# Patient Record
Sex: FEMALE | Race: WHITE | NOT HISPANIC OR LATINO | URBAN - METROPOLITAN AREA
[De-identification: names, ages, dates, MRNs, and addresses within clinical notes are randomized per-mention and may not be internally consistent; named-entity substitution may affect disease eponyms.]

---

## 2017-09-28 ENCOUNTER — INPATIENT (INPATIENT)
Facility: HOSPITAL | Age: 67
LOS: 0 days | Discharge: ANOTHER HOSPITAL | End: 2017-09-29
Attending: INTERNAL MEDICINE | Admitting: INTERNAL MEDICINE
Payer: MEDICARE

## 2017-09-28 VITALS
DIASTOLIC BLOOD PRESSURE: 78 MMHG | TEMPERATURE: 99 F | WEIGHT: 154.98 LBS | HEIGHT: 64 IN | RESPIRATION RATE: 18 BRPM | HEART RATE: 54 BPM | OXYGEN SATURATION: 99 % | SYSTOLIC BLOOD PRESSURE: 177 MMHG

## 2017-09-28 LAB
HCT VFR BLD CALC: 40.1 % — SIGNIFICANT CHANGE UP (ref 34.5–45)
HGB BLD-MCNC: 13.2 G/DL — SIGNIFICANT CHANGE UP (ref 11.5–15.5)
MCHC RBC-ENTMCNC: 28.3 PG — SIGNIFICANT CHANGE UP (ref 27–34)
MCHC RBC-ENTMCNC: 32.9 GM/DL — SIGNIFICANT CHANGE UP (ref 32–36)
MCV RBC AUTO: 86.2 FL — SIGNIFICANT CHANGE UP (ref 80–100)
PLATELET # BLD AUTO: 192 K/UL — SIGNIFICANT CHANGE UP (ref 150–400)
RBC # BLD: 4.65 M/UL — SIGNIFICANT CHANGE UP (ref 3.8–5.2)
RBC # FLD: 14.3 % — SIGNIFICANT CHANGE UP (ref 10.3–14.5)
WBC # BLD: 6.3 K/UL — SIGNIFICANT CHANGE UP (ref 3.8–10.5)
WBC # FLD AUTO: 6.3 K/UL — SIGNIFICANT CHANGE UP (ref 3.8–10.5)

## 2017-09-28 PROCEDURE — 93010 ELECTROCARDIOGRAM REPORT: CPT

## 2017-09-28 PROCEDURE — 71010: CPT | Mod: 26

## 2017-09-28 RX ORDER — NITROGLYCERIN 6.5 MG
0.5 CAPSULE, EXTENDED RELEASE ORAL ONCE
Qty: 0 | Refills: 0 | Status: COMPLETED | OUTPATIENT
Start: 2017-09-28 | End: 2017-09-28

## 2017-09-28 RX ORDER — SODIUM CHLORIDE 9 MG/ML
3 INJECTION INTRAMUSCULAR; INTRAVENOUS; SUBCUTANEOUS ONCE
Qty: 0 | Refills: 0 | Status: COMPLETED | OUTPATIENT
Start: 2017-09-28 | End: 2017-09-28

## 2017-09-28 RX ADMIN — SODIUM CHLORIDE 3 MILLILITER(S): 9 INJECTION INTRAMUSCULAR; INTRAVENOUS; SUBCUTANEOUS at 23:36

## 2017-09-28 RX ADMIN — Medication 0.5 INCH(S): at 23:36

## 2017-09-28 NOTE — ED ADULT NURSE NOTE - NS ED NOTE ABUSE SUSPICION NEGLECT YN
----- Message from Iraj Strickland PA-C sent at 1/4/2017  4:24 PM CST -----  Please call the patient regarding his abnormal result. Still mild anemia, should take OTC Fe for 3 months   No

## 2017-09-28 NOTE — ED PROVIDER NOTE - OBJECTIVE STATEMENT
66 yo female PMH CAD, MI, s/p 10 stents, from [place] and visiting son since yesterday after 6 week trip in [place], presents c/o chest heaviness starting on right and radiating to left chest and right jaw for the past 2-3 hours, relieved after 2 sublingual NTG and 4 baby aspirin. Pt states her anginal equivalent for CP is right sided jaw pain but has never felt pain radiating to left chest. +Dry cough for weeks that she was told is allergic. Denies pleuritic component to CP. +SOB before but not at this time. Had pain to achilles area of left ankle during trip in [place] which she thought was musculoskeletal. Denies dizziness, fainting. Had heavy Italian meal before bed but denies indigestion or GERD symptoms. Last hospitalization, catheterization, stent placement was in 2014. 68 yo female PMH CAD, MI, s/p 10 stents, from [place] and visiting son since yesterday after 6 week trip in [place], presents c/o chest heaviness starting on right and radiating to left chest and right jaw for the past 2-3 hours.  Sig relief with 2 sublingual NTG and 4 baby aspirin.  Pt states her anginal equivalent for CP is right sided jaw pain but has never felt pain radiating to left chest.  +Dry cough for weeks that she was told is allergic.  Denies pleuritic component to CP.  +SOB before but not at this time.  Had pain to achilles area of left ankle during trip in [place] which she thought was musculoskeletal.  Denies dizziness, fainting.  Had heavy Italian meal before bed but denies indigestion or GERD symptoms.  Last hospitalization, catheterization, stent placement was in 2014.

## 2017-09-28 NOTE — ED ADULT NURSE NOTE - OBJECTIVE STATEMENT
Pt states she was reading in a chair when she developed sharp chest pain located medial and radiating to should blades.  Pt states she has hx of 2 MI last one in 2005 and 10 stents.  Pt took 4 baby asa and total 2 nitro tabs and now states the chest pain has obsolved.  BP as noted.  Pt denies SOB at this time, but did have SOB earlier with the chest pain.  Pt denies new onset edema but states she recently traveled out of states and states she had a "bug bite on her right leg and noticed a red bump with slight swelling".  Safety maintained, will continue to [facility].

## 2017-09-28 NOTE — ED ADULT TRIAGE NOTE - CHIEF COMPLAINT QUOTE
chest pain. Hx of MI with 10 stents. Reports bilateral chest pain. took 4 baby aspirin and 2 SL nitro PTA with significant symptom improvement. No acute distress noted.

## 2017-09-29 ENCOUNTER — INPATIENT (INPATIENT)
Facility: HOSPITAL | Age: 67
LOS: 7 days | Discharge: HOME | DRG: 270 | End: 2017-10-07
Attending: STUDENT IN AN ORGANIZED HEALTH CARE EDUCATION/TRAINING PROGRAM | Admitting: THORACIC SURGERY (CARDIOTHORACIC VASCULAR SURGERY)
Payer: MEDICARE

## 2017-09-29 ENCOUNTER — TRANSCRIPTION ENCOUNTER (OUTPATIENT)
Age: 67
End: 2017-09-29

## 2017-09-29 VITALS
DIASTOLIC BLOOD PRESSURE: 66 MMHG | HEART RATE: 44 BPM | OXYGEN SATURATION: 98 % | TEMPERATURE: 99 F | SYSTOLIC BLOOD PRESSURE: 177 MMHG | RESPIRATION RATE: 16 BRPM

## 2017-09-29 VITALS
TEMPERATURE: 98 F | DIASTOLIC BLOOD PRESSURE: 73 MMHG | SYSTOLIC BLOOD PRESSURE: 160 MMHG | WEIGHT: 158.07 LBS | HEART RATE: 49 BPM | OXYGEN SATURATION: 95 % | RESPIRATION RATE: 18 BRPM

## 2017-09-29 DIAGNOSIS — C50.912 MALIGNANT NEOPLASM OF UNSPECIFIED SITE OF LEFT FEMALE BREAST: ICD-10-CM

## 2017-09-29 DIAGNOSIS — I25.10 ATHEROSCLEROTIC HEART DISEASE OF NATIVE CORONARY ARTERY WITHOUT ANGINA PECTORIS: ICD-10-CM

## 2017-09-29 DIAGNOSIS — Z90.12 ACQUIRED ABSENCE OF LEFT BREAST AND NIPPLE: Chronic | ICD-10-CM

## 2017-09-29 DIAGNOSIS — R35.0 FREQUENCY OF MICTURITION: ICD-10-CM

## 2017-09-29 DIAGNOSIS — Z95.5 PRESENCE OF CORONARY ANGIOPLASTY IMPLANT AND GRAFT: Chronic | ICD-10-CM

## 2017-09-29 DIAGNOSIS — I20.0 UNSTABLE ANGINA: ICD-10-CM

## 2017-09-29 DIAGNOSIS — I25.110 ATHEROSCLEROTIC HEART DISEASE OF NATIVE CORONARY ARTERY WITH UNSTABLE ANGINA PECTORIS: ICD-10-CM

## 2017-09-29 DIAGNOSIS — Z29.9 ENCOUNTER FOR PROPHYLACTIC MEASURES, UNSPECIFIED: ICD-10-CM

## 2017-09-29 LAB
ALBUMIN SERPL ELPH-MCNC: 3.7 G/DL — SIGNIFICANT CHANGE UP (ref 3.3–5)
ALP SERPL-CCNC: 61 U/L — SIGNIFICANT CHANGE UP (ref 40–120)
ALT FLD-CCNC: 37 U/L — SIGNIFICANT CHANGE UP (ref 12–78)
ANION GAP SERPL CALC-SCNC: 9 MMOL/L — SIGNIFICANT CHANGE UP (ref 5–17)
APPEARANCE UR: CLEAR — SIGNIFICANT CHANGE UP
APTT BLD: 32.5 SEC — SIGNIFICANT CHANGE UP (ref 27.5–37.4)
APTT BLD: 33.1 SEC — SIGNIFICANT CHANGE UP (ref 27.5–37.4)
AST SERPL-CCNC: 21 U/L — SIGNIFICANT CHANGE UP (ref 15–37)
BASOPHILS # BLD AUTO: 0.1 K/UL — SIGNIFICANT CHANGE UP (ref 0–0.2)
BASOPHILS # BLD AUTO: 0.1 K/UL — SIGNIFICANT CHANGE UP (ref 0–0.2)
BASOPHILS NFR BLD AUTO: 1.1 % — SIGNIFICANT CHANGE UP (ref 0–2)
BASOPHILS NFR BLD AUTO: 2 % — SIGNIFICANT CHANGE UP (ref 0–2)
BILIRUB SERPL-MCNC: 0.3 MG/DL — SIGNIFICANT CHANGE UP (ref 0.2–1.2)
BILIRUB UR-MCNC: NEGATIVE — SIGNIFICANT CHANGE UP
BUN SERPL-MCNC: 17 MG/DL — SIGNIFICANT CHANGE UP (ref 7–23)
CALCIUM SERPL-MCNC: 9.1 MG/DL — SIGNIFICANT CHANGE UP (ref 8.5–10.1)
CHLORIDE SERPL-SCNC: 105 MMOL/L — SIGNIFICANT CHANGE UP (ref 96–108)
CK SERPL-CCNC: 36 U/L — SIGNIFICANT CHANGE UP (ref 26–192)
CK SERPL-CCNC: 41 U/L — SIGNIFICANT CHANGE UP (ref 26–192)
CO2 SERPL-SCNC: 27 MMOL/L — SIGNIFICANT CHANGE UP (ref 22–31)
COLOR SPEC: SIGNIFICANT CHANGE UP
COMMENT - URINE: SIGNIFICANT CHANGE UP
CREAT SERPL-MCNC: 0.76 MG/DL — SIGNIFICANT CHANGE UP (ref 0.5–1.3)
D DIMER BLD IA.RAPID-MCNC: 174 NG/ML DDU — SIGNIFICANT CHANGE UP
DIFF PNL FLD: NEGATIVE — SIGNIFICANT CHANGE UP
EOSINOPHIL # BLD AUTO: 0.1 K/UL — SIGNIFICANT CHANGE UP (ref 0–0.5)
EOSINOPHIL # BLD AUTO: 0.1 K/UL — SIGNIFICANT CHANGE UP (ref 0–0.5)
EOSINOPHIL NFR BLD AUTO: 2.2 % — SIGNIFICANT CHANGE UP (ref 0–6)
GLUCOSE SERPL-MCNC: 110 MG/DL — HIGH (ref 70–99)
GLUCOSE UR QL: NEGATIVE — SIGNIFICANT CHANGE UP
HCT VFR BLD CALC: 43.7 % — SIGNIFICANT CHANGE UP (ref 34.5–45)
HGB BLD-MCNC: 14.6 G/DL — SIGNIFICANT CHANGE UP (ref 11.5–15.5)
INR BLD: 1.06 RATIO — SIGNIFICANT CHANGE UP (ref 0.88–1.16)
INR BLD: 1.1 RATIO — SIGNIFICANT CHANGE UP (ref 0.88–1.16)
KETONES UR-MCNC: NEGATIVE — SIGNIFICANT CHANGE UP
LEUKOCYTE ESTERASE UR-ACNC: ABNORMAL
LIDOCAIN IGE QN: 95 U/L — SIGNIFICANT CHANGE UP (ref 73–393)
LYMPHOCYTES # BLD AUTO: 2.2 K/UL — SIGNIFICANT CHANGE UP (ref 1–3.3)
LYMPHOCYTES # BLD AUTO: 2.2 K/UL — SIGNIFICANT CHANGE UP (ref 1–3.3)
LYMPHOCYTES # BLD AUTO: 29 % — SIGNIFICANT CHANGE UP (ref 13–44)
LYMPHOCYTES # BLD AUTO: 34.8 % — SIGNIFICANT CHANGE UP (ref 13–44)
MANUAL DIF COMMENT BLD-IMP: MANUAL DIFF — SIGNIFICANT CHANGE UP
MANUAL SMEAR VERIFICATION: SIGNIFICANT CHANGE UP
MCHC RBC-ENTMCNC: 29.6 PG — SIGNIFICANT CHANGE UP (ref 27–34)
MCHC RBC-ENTMCNC: 33.5 GM/DL — SIGNIFICANT CHANGE UP (ref 32–36)
MCV RBC AUTO: 88.5 FL — SIGNIFICANT CHANGE UP (ref 80–100)
MONOCYTES # BLD AUTO: 0.8 K/UL — SIGNIFICANT CHANGE UP (ref 0–0.9)
MONOCYTES # BLD AUTO: 0.9 K/UL — SIGNIFICANT CHANGE UP (ref 0–0.9)
MONOCYTES NFR BLD AUTO: 12.1 % — SIGNIFICANT CHANGE UP (ref 2–14)
MONOCYTES NFR BLD AUTO: 13 % — SIGNIFICANT CHANGE UP (ref 2–14)
MYELOCYTES NFR BLD: 1 % — HIGH (ref 0–0)
NEUTROPHILS # BLD AUTO: 3.1 K/UL — SIGNIFICANT CHANGE UP (ref 1.8–7.4)
NEUTROPHILS # BLD AUTO: 3.2 K/UL — SIGNIFICANT CHANGE UP (ref 1.8–7.4)
NEUTROPHILS NFR BLD AUTO: 49.8 % — SIGNIFICANT CHANGE UP (ref 43–77)
NEUTROPHILS NFR BLD AUTO: 54 % — SIGNIFICANT CHANGE UP (ref 43–77)
NEUTS BAND # BLD: 1 % — SIGNIFICANT CHANGE UP (ref 0–8)
NITRITE UR-MCNC: NEGATIVE — SIGNIFICANT CHANGE UP
NT-PROBNP SERPL-SCNC: 190 PG/ML — HIGH (ref 0–125)
PA ADP PRP-ACNC: 160 PRU — LOW (ref 194–417)
PH UR: 6.5 — SIGNIFICANT CHANGE UP (ref 5–8)
PLAT MORPH BLD: NORMAL — SIGNIFICANT CHANGE UP
PLATELET # BLD AUTO: 247 K/UL — SIGNIFICANT CHANGE UP (ref 150–400)
PLATELET COUNT - ESTIMATE: ADEQUATE — SIGNIFICANT CHANGE UP
POTASSIUM SERPL-MCNC: 3.5 MMOL/L — SIGNIFICANT CHANGE UP (ref 3.5–5.3)
POTASSIUM SERPL-SCNC: 3.5 MMOL/L — SIGNIFICANT CHANGE UP (ref 3.5–5.3)
PROT SERPL-MCNC: 6.8 GM/DL — SIGNIFICANT CHANGE UP (ref 6–8.3)
PROT UR-MCNC: NEGATIVE — SIGNIFICANT CHANGE UP
PROTHROM AB SERPL-ACNC: 11.5 SEC — SIGNIFICANT CHANGE UP (ref 9.8–12.7)
PROTHROM AB SERPL-ACNC: 11.9 SEC — SIGNIFICANT CHANGE UP (ref 9.8–12.7)
RBC # BLD: 4.94 M/UL — SIGNIFICANT CHANGE UP (ref 3.8–5.2)
RBC # FLD: 13.9 % — SIGNIFICANT CHANGE UP (ref 10.3–14.5)
RBC BLD AUTO: NORMAL — SIGNIFICANT CHANGE UP
RBC CASTS # UR COMP ASSIST: SIGNIFICANT CHANGE UP /HPF (ref 0–2)
SODIUM SERPL-SCNC: 141 MMOL/L — SIGNIFICANT CHANGE UP (ref 135–145)
SP GR SPEC: >1.03 — HIGH (ref 1.01–1.02)
TROPONIN I SERPL-MCNC: 0.06 NG/ML — HIGH (ref 0.01–0.04)
TROPONIN I SERPL-MCNC: 0.08 NG/ML — HIGH (ref 0.01–0.04)
TROPONIN I SERPL-MCNC: <0.015 NG/ML — SIGNIFICANT CHANGE UP (ref 0.01–0.04)
UROBILINOGEN FLD QL: NEGATIVE — SIGNIFICANT CHANGE UP
WBC # BLD: 6.4 K/UL — SIGNIFICANT CHANGE UP (ref 3.8–10.5)
WBC # FLD AUTO: 6.4 K/UL — SIGNIFICANT CHANGE UP (ref 3.8–10.5)
WBC UR QL: ABNORMAL /HPF (ref 0–5)

## 2017-09-29 PROCEDURE — 93010 ELECTROCARDIOGRAM REPORT: CPT

## 2017-09-29 PROCEDURE — 99223 1ST HOSP IP/OBS HIGH 75: CPT

## 2017-09-29 PROCEDURE — 93458 L HRT ARTERY/VENTRICLE ANGIO: CPT | Mod: 26

## 2017-09-29 PROCEDURE — 99285 EMERGENCY DEPT VISIT HI MDM: CPT

## 2017-09-29 PROCEDURE — 99222 1ST HOSP IP/OBS MODERATE 55: CPT

## 2017-09-29 RX ORDER — METOPROLOL TARTRATE 50 MG
100 TABLET ORAL AT BEDTIME
Qty: 0 | Refills: 0 | Status: DISCONTINUED | OUTPATIENT
Start: 2017-09-29 | End: 2017-09-29

## 2017-09-29 RX ORDER — OXYBUTYNIN CHLORIDE 5 MG
15 TABLET ORAL DAILY
Qty: 0 | Refills: 0 | Status: DISCONTINUED | OUTPATIENT
Start: 2017-09-29 | End: 2017-09-29

## 2017-09-29 RX ORDER — SODIUM CHLORIDE 9 MG/ML
1000 INJECTION INTRAMUSCULAR; INTRAVENOUS; SUBCUTANEOUS
Qty: 0 | Refills: 0 | Status: DISCONTINUED | OUTPATIENT
Start: 2017-09-29 | End: 2017-09-29

## 2017-09-29 RX ORDER — INFLUENZA VIRUS VACCINE 15; 15; 15; 15 UG/.5ML; UG/.5ML; UG/.5ML; UG/.5ML
0.5 SUSPENSION INTRAMUSCULAR ONCE
Qty: 0 | Refills: 0 | Status: DISCONTINUED | OUTPATIENT
Start: 2017-09-29 | End: 2017-09-29

## 2017-09-29 RX ORDER — OXYBUTYNIN CHLORIDE 5 MG
15 TABLET ORAL AT BEDTIME
Qty: 0 | Refills: 0 | Status: DISCONTINUED | OUTPATIENT
Start: 2017-09-29 | End: 2017-09-30

## 2017-09-29 RX ORDER — MULTIVIT-MIN/FERROUS GLUCONATE 9 MG/15 ML
1 LIQUID (ML) ORAL
Qty: 0 | Refills: 0 | COMMUNITY

## 2017-09-29 RX ORDER — METOPROLOL TARTRATE 50 MG
100 TABLET ORAL DAILY
Qty: 0 | Refills: 0 | Status: DISCONTINUED | OUTPATIENT
Start: 2017-09-29 | End: 2017-09-30

## 2017-09-29 RX ORDER — PANTOPRAZOLE SODIUM 20 MG/1
40 TABLET, DELAYED RELEASE ORAL
Qty: 0 | Refills: 0 | Status: DISCONTINUED | OUTPATIENT
Start: 2017-09-29 | End: 2017-10-07

## 2017-09-29 RX ORDER — OXYBUTYNIN CHLORIDE 5 MG
1 TABLET ORAL
Qty: 0 | Refills: 0 | COMMUNITY

## 2017-09-29 RX ORDER — ASPIRIN/CALCIUM CARB/MAGNESIUM 324 MG
81 TABLET ORAL DAILY
Qty: 0 | Refills: 0 | Status: DISCONTINUED | OUTPATIENT
Start: 2017-09-29 | End: 2017-10-07

## 2017-09-29 RX ORDER — METOPROLOL TARTRATE 50 MG
25 TABLET ORAL EVERY 12 HOURS
Qty: 0 | Refills: 0 | Status: DISCONTINUED | OUTPATIENT
Start: 2017-09-29 | End: 2017-09-29

## 2017-09-29 RX ORDER — METOPROLOL TARTRATE 50 MG
1 TABLET ORAL
Qty: 0 | Refills: 0 | COMMUNITY

## 2017-09-29 RX ORDER — CLOPIDOGREL BISULFATE 75 MG/1
1 TABLET, FILM COATED ORAL
Qty: 0 | Refills: 0 | COMMUNITY

## 2017-09-29 RX ORDER — DILTIAZEM HCL 120 MG
1 CAPSULE, EXT RELEASE 24 HR ORAL
Qty: 0 | Refills: 0 | COMMUNITY

## 2017-09-29 RX ORDER — ACETAMINOPHEN 500 MG
650 TABLET ORAL EVERY 6 HOURS
Qty: 0 | Refills: 0 | Status: DISCONTINUED | OUTPATIENT
Start: 2017-09-29 | End: 2017-09-29

## 2017-09-29 RX ORDER — ASPIRIN/CALCIUM CARB/MAGNESIUM 324 MG
325 TABLET ORAL DAILY
Qty: 0 | Refills: 0 | Status: DISCONTINUED | OUTPATIENT
Start: 2017-09-29 | End: 2017-09-29

## 2017-09-29 RX ORDER — ATORVASTATIN CALCIUM 80 MG/1
40 TABLET, FILM COATED ORAL AT BEDTIME
Qty: 0 | Refills: 0 | Status: DISCONTINUED | OUTPATIENT
Start: 2017-09-29 | End: 2017-09-29

## 2017-09-29 RX ORDER — SODIUM CHLORIDE 9 MG/ML
3 INJECTION INTRAMUSCULAR; INTRAVENOUS; SUBCUTANEOUS EVERY 8 HOURS
Qty: 0 | Refills: 0 | Status: DISCONTINUED | OUTPATIENT
Start: 2017-09-29 | End: 2017-10-07

## 2017-09-29 RX ORDER — ENOXAPARIN SODIUM 100 MG/ML
40 INJECTION SUBCUTANEOUS EVERY 24 HOURS
Qty: 0 | Refills: 0 | Status: DISCONTINUED | OUTPATIENT
Start: 2017-09-29 | End: 2017-09-29

## 2017-09-29 RX ORDER — HEPARIN SODIUM 5000 [USP'U]/ML
4100 INJECTION INTRAVENOUS; SUBCUTANEOUS EVERY 6 HOURS
Qty: 0 | Refills: 0 | Status: DISCONTINUED | OUTPATIENT
Start: 2017-09-29 | End: 2017-10-02

## 2017-09-29 RX ORDER — GABAPENTIN 400 MG/1
200 CAPSULE ORAL
Qty: 0 | Refills: 0 | COMMUNITY

## 2017-09-29 RX ORDER — ATORVASTATIN CALCIUM 80 MG/1
80 TABLET, FILM COATED ORAL AT BEDTIME
Qty: 0 | Refills: 0 | Status: DISCONTINUED | OUTPATIENT
Start: 2017-09-29 | End: 2017-10-07

## 2017-09-29 RX ORDER — HEPARIN SODIUM 5000 [USP'U]/ML
INJECTION INTRAVENOUS; SUBCUTANEOUS
Qty: 25000 | Refills: 0 | Status: DISCONTINUED | OUTPATIENT
Start: 2017-09-29 | End: 2017-09-30

## 2017-09-29 RX ORDER — HEPARIN SODIUM 5000 [USP'U]/ML
4100 INJECTION INTRAVENOUS; SUBCUTANEOUS ONCE
Qty: 0 | Refills: 0 | Status: COMPLETED | OUTPATIENT
Start: 2017-09-29 | End: 2017-09-30

## 2017-09-29 RX ORDER — NITROGLYCERIN 6.5 MG
1 CAPSULE, EXTENDED RELEASE ORAL
Qty: 0 | Refills: 0 | COMMUNITY

## 2017-09-29 RX ORDER — CLOPIDOGREL BISULFATE 75 MG/1
75 TABLET, FILM COATED ORAL DAILY
Qty: 0 | Refills: 0 | Status: DISCONTINUED | OUTPATIENT
Start: 2017-09-29 | End: 2017-09-29

## 2017-09-29 RX ADMIN — Medication 100 MILLIGRAM(S): at 19:33

## 2017-09-29 RX ADMIN — CLOPIDOGREL BISULFATE 75 MILLIGRAM(S): 75 TABLET, FILM COATED ORAL at 13:14

## 2017-09-29 RX ADMIN — SODIUM CHLORIDE 3 MILLILITER(S): 9 INJECTION INTRAMUSCULAR; INTRAVENOUS; SUBCUTANEOUS at 23:08

## 2017-09-29 RX ADMIN — Medication 325 MILLIGRAM(S): at 13:13

## 2017-09-29 RX ADMIN — ATORVASTATIN CALCIUM 80 MILLIGRAM(S): 80 TABLET, FILM COATED ORAL at 23:16

## 2017-09-29 NOTE — DISCHARGE NOTE ADULT - CARE PROVIDER_API CALL
Libra Elizabeth), Surgery; Thoracic and Cardiac Surgery  [address] [place] [address]  Phone: [phone]  Fax: [phone]

## 2017-09-29 NOTE — DISCHARGE NOTE ADULT - CARE PLAN
Principal Discharge DX:	CAD (coronary artery disease)  Goal:	remains free of chest pain  Instructions for follow-up, activity and diet:	to have CABG by Dr. Elizabeth

## 2017-09-29 NOTE — ED ADULT NURSE REASSESSMENT NOTE - NS ED NURSE REASSESS COMMENT FT1
Pt sleeping, easily arousable. VS as charted. Pt updated on plan of care. pt positioned for comfort. will continue to monitor.

## 2017-09-29 NOTE — H&P ADULT - NSHPPHYSICALEXAM_GEN_ALL_CORE
General: Well nourished, well developed, no acute distress.                                                         Neuro: Normal exam oriented to person/place & time with no focal motor or sensory  deficits.                    Eyes: Normal exam of conjunctiva & lids, pupils equally reactive.   ENT: Normal exam of nasal/oral mucosa with absence of cyanosis.   Neck: Normal exam of jugular veins, trachea & thyroid.   Chest: Normal lung exam with good air movement absence of wheezes, rales, or rhonchi:                                                                          CV:  Auscultation: normal [ ] S3[ ] S4[ ] Irregular [ ] Rub[ ] Clicks[ ]    Murmurs none:[]systolic [ ]  diastolic [ ] holosystolic [ ]  Carotids: No Bruits[ ] Other:                  Abdominal Aorta: normal [ ] nonpalpable[]                                                                         GI: Soft, non-tender, non-distended, liver & spleen with no noted masses or tenderness. Bowel sounds active in all 4 quadrants                                                                                             Extremities: Normal no evidence of cyanosis or deformity Edema: none[ ]trace[ ]1+[ ]2+[ ]3+[ ]4+[ ]  Lower Extremity Pulses: Right[   /4] Left[  /4 ] Varicosities[  ]  SKIN :  Normal exam to inspection & palation.  No rashes, breakdown General: Well nourished, well developed, no acute distress.  Emotional distress 2/2 new diagnosis                                                          Neuro: Normal exam oriented to person/place & time with no focal motor or sensory  deficits.                    Eyes: Normal exam of conjunctiva & lids, pupils equally reactive.   ENT: Normal exam of nasal/oral mucosa with absence of cyanosis.   Neck: Normal exam of jugular veins, trachea & thyroid.   Chest: Normal lung exam with good air movement absence of wheezes, rales, or rhonchi:                                                                          CV:  Auscultation: normal [ ] S3[ ] S4[ ] Irregular [ ] Rub[ ] Clicks[ ]    Murmurs none:[]systolic [ ]  diastolic [ ] holosystolic [ ]  Carotids: No Bruits[ ] Other:                  Abdominal Aorta: normal [ ] nonpalpable[]                                                                         GI: Soft, non-tender, non-distended, liver & spleen with no noted masses or tenderness. Bowel sounds active in all 4 quadrants                                                                                             Extremities: Normal no evidence of cyanosis or deformity Edema: none[ ]trace[ ]1+[ ]2+[ ]3+[ ]4+[ ]  Lower Extremity Pulses: Right[   /4] Left[  /4 ] Varicosities[  ]  SKIN :  Normal exam to inspection & palation.  No rashes, breakdown General: Well nourished, well developed, no acute distress.  Emotional distress 2/2 new diagnosis                                                          Neuro: Non-focal                   Eyes: PERRLA EOMI conjunctiva clear  ENT: moist oral mucosa without lesions or poor dentition  Neck: supple, neg. JVD  Chest: Normal lung exam with good air movement absence of wheezes, rales, or rhonchi                                                                          CV:  Auscultation: normal [x ] S3[ ] S4[ ] Irregular [ ] Rub[ ] Clicks[ ]    Murmurs none:[x]systolic [ ]  diastolic [ ] holosystolic [ ]  Carotids: No Bruits[x ] Other:                  Abdominal Aorta: normal [ x] nonpalpable[x]                                                                         GI: Soft, non-tender, non-distended, liver & spleen with no noted masses or tenderness. Bowel sounds active in all 4 quadrants                                                                                       Incision:  R radial cath site: CDI  Extremities: Normal no evidence of cyanosis or deformity Edema: none[x ]trace[ ]1+[ ]2+[ ]3+[ ]4+[ ]  Lower Extremity Pulses: Right[   3/4] Left[ 3 /4 ] Varicosities[  ]  SKIN :  warm dry intact No rashes, breakdown.  Bilateral knee scarring/left chest scar well healed

## 2017-09-29 NOTE — H&P ADULT - ASSESSMENT
66 YO F, former all-state ,  with pmh of CAD s/p ~ 10 coronary stents (2005 and ~ 2011), prior MI, HTN, hyperlipidemia, Breast CA (1993) s/p left mastectomy and chemo, former tobacco use presented to ER via ambulance with c/o chest pain.   Patient prompted spouse to call EMS after experienced moderately severe discomfort in a band-like distribution across her chest with radiation to the right aspect of her jaw -- shared some characteristics of her chronic stable angina but occurred at rest and did not improve with 1 SL NTG which is usual.  Typically her angina is prompted by exertion and relieved quickly with rest.  s ultimately resolved following a 2nd NTG given to her by EMS.    She lives in [place] and has been visiting family in [place] since returning from a  vacation to [place] and [place] 1 week ago.  Her daughter is an anesthesia resident at [facility].     She underwent cardiac cath at [facility] which revealed 2 Vessel CAD with ostial LAD and LCX disease (instent restenosis) and was transferred to [facility] for further management.

## 2017-09-29 NOTE — CONSULT NOTE ADULT - PROBLEM SELECTOR RECOMMENDATION 9
Chronic stable angina with numerous coronary stents now with symptoms of angina at rest; coronary angiography planned for this afternoon; continue dual antiplt therapy, metoprolol, statin.

## 2017-09-29 NOTE — PROGRESS NOTE ADULT - ASSESSMENT
Patient now s/p angiogram that revealed in stent restenosis on both ostial LAD and ostial LCX  stents.  - recommend CT surgery consult for 2 vessel by pass Patient now s/p angiogram that revealed in stent restenosis on both ostial LAD and ostial LCX  stents.  - recommend CT surgery consult for 2 vessel bypass  -d/c plavix & lovenox  -start IV heparin in 6 hours post radial band removal  -plan d/w pt/family/Dr. Ochoa. Patient now s/p angiogram that revealed in stent restenosis on both ostial LAD and ostial LCX  stents.  - recommend CT surgery consult for 2 vessel bypass ( will be transferred to [facility] for Dr. Elizabeth)  -d/c plavix & lovenox  -start IV heparin in 6 hours post radial band removal  -plan d/w pt/family/Dr. Ochoa.

## 2017-09-29 NOTE — DISCHARGE NOTE ADULT - MEDICATION SUMMARY - MEDICATIONS TO TAKE
I will START or STAY ON the medications listed below when I get home from the hospital:    aspirin 81 mg oral tablet, chewable  -- 1 tab(s) by mouth once a day  -- Indication: For CAD (coronary artery disease)    nitroglycerin 0.4 mg sublingual tablet  -- 1 tab(s) under tongue every 5 minutes, As Needed  -- Indication: For CAD (coronary artery disease)    dilTIAZem 120 mg/24 hours oral capsule, extended release  -- 1 cap(s) by mouth once a day (at bedtime)  -- Indication: For CAD (coronary artery disease)    Neurontin 100 mg oral capsule  -- 200 milligram(s) by mouth once a day, As Needed  -- Indication: For pain control    rosuvastatin 40 mg oral tablet  -- 1 tab(s) by mouth once a day (at bedtime)  -- Indication: For Hyperlipidemia    Metoprolol Succinate  mg oral tablet, extended release  -- 1 tab(s) by mouth once a day (at bedtime)  -- Indication: For CAD (coronary artery disease)    oxybutynin 15 mg/24 hr oral tablet, extended release  -- 1 tab(s) by mouth once a day (at bedtime)  -- Indication: For antispasmodic    multivitamin with minerals  -- 1  by mouth once a day (at bedtime)  -- Indication: For supplements

## 2017-09-29 NOTE — H&P ADULT - ASSESSMENT
68 yo female PMH CAD, MI, s/p 10 stents in 4 ocassions, last one was in 2012, from [place] and visiting son since yesterday after 6 week trip in [place], presents c/o chest heaviness since last night around 9.30 pm while she was reading, starting on right and radiating to left chest and right jaw.   Her symptoms relieved with 2 sublingual NTG and 4 baby aspirin.  Pt states her anginal equivalent for CP is right sided jaw pain but has never felt pain radiating to left chest.  +DrDenies pleuritic component to CP.  No associated SOB, sweating, palpitation.  Had pain to achilles area of left ankle during trip in [place] which she thought was musculoskeletal.  Denies dizziness, fainting.  She denies any chest pain at this time.         1. Chest pain-  with borderline troponin  CAD S/P multiple stents in the past  Suspected NSTEMI  Admit to tele  Patient had 4 baby aspirin last night  Continue aspirin, plavix, BB  Consult Dr Valadez, case discussed with him, possible cath today  Continue statin.    2. HTN-stable  Continue cartia, toprol xl    3. Urinary incontinence-  Continue oxybutinin.     4. DVT prophylaxis.

## 2017-09-29 NOTE — H&P ADULT - NSHPLABSRESULTS_GEN_ALL_CORE
Cardiac Cath 09.29.17 @ 16:41  LMCA: Diffuse irregularity.There is mild diffuse disease noted.  LAD: Diffuse irregularity.   Prox LAD: Ostial.90% stenosis 12 mm length.Pre procedure RUSLAN III flow   was noted. The lesion was diagnosed as a moderate risk lesion.   The lesion was previously treated with the following devices: drug   eluting stent.   Prox LAD: Distal subsection.0% stenosis 20 mm length.Pre procedure RUSLAN   III flow was noted. The lesion was diagnosed as a low risk lesion.  LCx: Diffuse irregularity.   Prox CX: Ostial.60% stenosis 12 mm length.Pre procedure RUSLAN III flow was   noted. Good runoff was present.The lesion was diagnosed as a moderate   risk lesion. The lesion was previously treated with the following devices: drug   eluting stent.  1st Ob Winter: Proximal subsection.100% stenosis 20 mm length.Pre procedure   RUSLAN 0 flowwas noted. The lesion was diagnosed as a high risk lesion.   The lesion was previously treated with the following devices: drug   eluting stent.  RCA: Diffuse irregularity.There is mild diffuse disease noted.        2 Vessel CAD with ostial LAD and LCX disease (instent restenosis) and NL   LV FX.

## 2017-09-29 NOTE — H&P ADULT - HISTORY OF PRESENT ILLNESS
68 YO F, former all-state ,  with pmh of CAD s/p ~ 10 coronary stents (2005 and ~ 2011), prior MI, HTN, hyperlipidemia, Breast CA (1993) s/p left mastectomy and chemo, former tobacco use presented to ER via ambulance with c/o chest pain.   Patient prompted spouse to call EMS after experienced moderately severe discomfort in a band-like distribution across her chest with radiation to the right aspect of her jaw -- shared some characteristics of her chronic stable angina but occurred at rest and did not improve with 1 SL NTG which is usual.  Typically her angina is prompted by exertion and relieved quickly with rest.  s ultimately resolved following a 2nd NTG given to her by EMS.    She lives in [place] and has been visiting family in [place] since returning from a  vacation to [place] and [place] 1 week ago.  Her daughter is an anesthesia resident at [facility] [place].     She underwent cardiac cath at [facility] which revealed 2 Vessel CAD with ostial LAD and LCX disease (instent restenosis) and was transferred to [facility] for further management. 66 YO F, former all-state ,  with pmh of CAD s/p ~ 10 coronary stents (2005 and ~ 2011), prior MI, HTN, hyperlipidemia, Breast CA (1993) s/p left mastectomy and chemo, former tobacco use presented to ER via ambulance with c/o chest pain.   Patient prompted spouse to call EMS after experienced moderately severe discomfort in a band-like distribution across her chest with radiation to the right aspect of her jaw -- shared some characteristics of her chronic stable angina but occurred at rest and did not improve with 1 SL NTG which is usual.  Typically her angina is prompted by exertion and relieved quickly with rest.  s ultimately resolved following a 2nd NTG given to her by EMS.    She lives in [place] and has been visiting family in [place] since returning from a  vacation to [place] and [place] 1 week ago.  Her daughter is an anesthesia resident at [employer] [facility].     She underwent cardiac cath at [facility] which revealed 2 Vessel CAD with ostial LAD and LCX disease (instent restenosis) and was transferred to [facility] for further management.     Daughter:  Dr. Leisa Cadena [phone]

## 2017-09-29 NOTE — H&P ADULT - NSHPREVIEWOFSYSTEMS_GEN_ALL_CORE
Patient is requesting a referral to see a Podiatrist.   Review of Systems: Negative unless indicated  GENERAL:  Fevers[] chills[] sweats[] fatigue[] weight loss[] weight gain []                                        NEURO:  parathesias[] seizures []  syncope []  confusion []                                                                                  EYES: glasses[]  blurry vision[]  discharge[] pain[] glaucoma []                                                                            ENMT:  difficulty hearing []  vertigo[]  dysphagia[] epistaxis[] recent dental work []                                      CV:  chest pain[] palpitations[] VIERA [] diaphoresis [] edema[]                                                                                             RESPIRATORY:  wheezing[] SOB[] cough [] sputum[] hemoptysis[]                                                                    GI:  nausea[]  vomiting []  diarrhea[] constipation [] melena []                                                                        : hematuria[ ]  dysuria[ ] urgency[] incontinence[]                                                                                              MUSKULOSKELETAL:  arthritis[ ]  joint swelling [ ] muscle weakness [ ]                                                                  SKIN/BREAST:  rash[ ] itching [ ]  hair loss[ ] masses[ ]                                                                                                PSYCH:  dementia [ ] depresion [ ] anxiety[ ]                                                                                                                  HEME/LYMPH:  bruises easily[ ] enlarged lymph nodes[ ] tender lymph nodes[ ]                                                 ENDOCRINE:  cold intolerance[ ] heat intolerance[ ] polydipsia[ ] Review of Systems:  Negative unless indicated  GENERAL:  Fevers[] chills[] sweats[] fatigue[] weight loss[] weight gain []                                        NEURO:  parathesias[] seizures []  syncope []  confusion []                                                                                  EYES: glasses[]  blurry vision[]  discharge[] pain[] glaucoma []                                                                            ENMT:  difficulty hearing []  vertigo[]  dysphagia[] epistaxis[] recent dental work []                                      CV:  chest pain[x] palpitations[] VIERA [x] diaphoresis [] edema[]                                                                                             RESPIRATORY:  wheezing[] SOB[] cough [] sputum[] hemoptysis[]                                                                    GI:  nausea[]  vomiting []  diarrhea[] constipation [] melena []                                                                        : hematuria[ ]  dysuria[ ] urgency[] incontinence[]                                                                                              MUSKULOSKELETAL:  arthritis[ ]  joint swelling [ ] muscle weakness [ ]                                                                  SKIN/BREAST:  rash[ ] itching [ ]  hair loss[ ] masses[ ]                                                                                                PSYCH:  dementia [ ] depresion [ ] anxiety[ ]                                                                                                                  HEME/LYMPH:  bruises easily[ ] enlarged lymph nodes[ ] tender lymph nodes[ ]                                                 ENDOCRINE:  cold intolerance[ ] heat intolerance[ ] polydipsia[ ]

## 2017-09-29 NOTE — H&P ADULT - NSHPLABSRESULTS_GEN_ALL_CORE
13.2   6.3   )-----------( 192      ( 28 Sep 2017 23:24 )             40.1     28 Sep 2017 23:24    141    |  105    |  17     ----------------------------<  110    3.5     |  27     |  0.76     Ca    9.1        28 Sep 2017 23:24    TPro  6.8    /  Alb  3.7    /  TBili  0.3    /  DBili  x      /  AST  21     /  ALT  37     /  AlkPhos  61     28 Sep 2017 23:24    LIVER FUNCTIONS - ( 28 Sep 2017 23:24 )  Alb: 3.7 g/dL / Pro: 6.8 gm/dL / ALK PHOS: 61 U/L / ALT: 37 U/L / AST: 21 U/L / GGT: x           PT/INR - ( 28 Sep 2017 23:24 )   PT: 11.5 sec;   INR: 1.06 ratio         PTT - ( 28 Sep 2017 23:24 )  PTT:32.5 sec  CAPILLARY BLOOD GLUCOSE        CARDIAC MARKERS ( 29 Sep 2017 05:40 )  0.060 ng/mL / x     / x     / x     / x      CARDIAC MARKERS ( 29 Sep 2017 02:24 )  0.078 ng/mL / x     / 36 U/L / x     / x      CARDIAC MARKERS ( 28 Sep 2017 23:24 )  <0.015 ng/mL / x     / 41 U/L / x     / x

## 2017-09-29 NOTE — H&P ADULT - PMH
Arthritis    CAD (coronary artery disease)    Malignant neoplasm of left female breast, unspecified estrogen receptor status, unspecified site of breast    MI (myocardial infarction)    S/P chemotherapy, time since greater than 12 weeks

## 2017-09-29 NOTE — DISCHARGE NOTE ADULT - HOSPITAL COURSE
68 yo female PMH CAD, MI, s/p 10 stents in 4 ocassions, last one was in 2012, from [place] and visiting son since yesterday after 6 week trip in [place], presents c/o chest heaviness since last night around 9.30 pm while she was reading, starting on right and radiating to left chest and right jaw.   Her symptoms relieved with 2 sublingual NTG and 4 baby aspirin.  Pt states her anginal equivalent for CP is right sided jaw pain but has never felt pain radiating to left chest.  +DrDenies pleuritic component to CP.  No associated SOB, sweating, palpitation.  Had pain to achilles area of left ankle during trip in [place] which she thought was musculoskeletal.  Denies dizziness, fainting.  She denies any chest pain at this time.   s/p Cardiac cath today showed ostial LAD & LCX stenosis, will be transferred to [facility] for CABG by Dr. Elizabeth. Pt & family made aware of plan by Dr. Ochoa.

## 2017-09-29 NOTE — H&P ADULT - NSHPPHYSICALEXAM_GEN_ALL_CORE
· CONSTITUTIONAL: Well appearing, well nourished, awake, alert, oriented to person, place, time/situation and in no apparent distress.  · ENMT: Airway patent, Nasal mucosa clear. Mouth with normal mucosa. Throat has no vesicles, no oropharyngeal exudates and uvula is midline.  · HEAD: Head atraumatic, normal cephalic shape.  · HEAD: Head is atraumatic. Head shape is symmetrical.  · EYES: Clear bilaterally, pupils equal, round and reactive to light.  · CARDIAC: Normal rate, regular rhythm.  Heart sounds S1, S2.  No murmurs, rubs or gallops.  · RESPIRATORY: Breath sounds clear and equal bilaterally.  · GASTROINTESTINAL: Abdomen soft, non-tender, no guarding.  · MUSCULOSKELETAL: Spine appears normal, range of motion is not limited, no muscle or joint tenderness  · NEUROLOGICAL: Alert and oriented, no focal deficits, no motor or sensory deficits.  · SKIN: Skin normal color for race, warm, dry and intact. No evidence of rash.  · PSYCHIATRIC: Alert and oriented to person, place, time/situation. normal mood and affect. no apparent risk to self or others.

## 2017-09-29 NOTE — H&P ADULT - NSHPSOCIALHISTORY_GEN_ALL_CORE
SOCIAL HISTORY:  Smoker: [ ] Yes  [ ] No        PACK YEARS:          Quit date:  ETOH use: [ ] Yes  [ ] No              FREQUENCY / QUANTITY:  Ilicit Drug use:  [ ] Yes  [ ] No  Occupation:   Living arrangements:   Assist device use:     Relevant Family History  FAMILY HISTORY:  Family history of heart disease: SOCIAL HISTORY:  Smoker: [ ] Yes  [x ] No        PACK YEARS:          Quit date: remote  ETOH use: [ ] Yes  [ x] No              FREQUENCY / QUANTITY:  Ilicit Drug use:  [ ] Yes  [ x] No  Occupation: retired  Living arrangements: with spouse in [place]  Assist device use: none

## 2017-09-29 NOTE — PROGRESS NOTE ADULT - SUBJECTIVE AND OBJECTIVE BOX
Cardiology NP     Patient is a 67y old  Female who presents with a chief complaint of Chest pain (29 Sep 2017 12:35)      HPI:     This is a 68 y/o female PMH CAD, MI, s/p 10 stents , last one was in 2012, presents c/o chest heaviness since last night around 9.30 pm while she was reading, starting on right and radiating to left chest and right jaw.   Her symptoms relieved with 2 sublingual NTG and 4 baby aspirin.  Pt states her anginal equivalent for CP is right sided jaw pain but has never felt pain radiating to left chest.      PAST MEDICAL & SURGICAL HISTORY:  MI (myocardial infarction)  CAD (coronary artery disease)  History of heart artery stent      MEDICATIONS  (STANDING):  enoxaparin Injectable 40 milliGRAM(s) SubCutaneous every 24 hours  sodium chloride 0.9%. 1000 milliLiter(s) (70 mL/Hr) IV Continuous <Continuous>  aspirin 325 milliGRAM(s) Oral daily  influenza   Vaccine 0.5 milliLiter(s) IntraMuscular once  metoprolol succinate  milliGRAM(s) Oral at bedtime  diltiazem    Tablet 120 milliGRAM(s) Oral daily  clopidogrel Tablet 75 milliGRAM(s) Oral daily  atorvastatin 40 milliGRAM(s) Oral at bedtime  oxybutynin 15 milliGRAM(s) Oral daily    MEDICATIONS  (PRN):  acetaminophen   Tablet 650 milliGRAM(s) Oral every 6 hours PRN For Temp greater than 38.5 C (101.3 F)      Allergies    Percocet 10/325 (Unknown)    Intolerances        REVIEW OF SYSTEMS: As mentioned in HPI all others Negative     Vital Signs Last 24 Hrs  T(C): 36.9 (29 Sep 2017 12:33), Max: 37.2 (29 Sep 2017 10:45)  T(F): 98.5 (29 Sep 2017 12:33), Max: 99 (29 Sep 2017 10:45)  HR: 50 (29 Sep 2017 14:29) (48 - 62)  BP: 175/60 (29 Sep 2017 14:29) (135/63 - 177/78)  BP(mean): --  RR: 16 (29 Sep 2017 14:29) (16 - 18)  SpO2: 98% (29 Sep 2017 14:29) (98% - 100%)    PHYSICAL EXAM:  NERVOUS SYSTEM:  Alert & Oriented X3, Good concentration  CHEST/LUNG: Clear to auscultation bilaterally; No rales, rhonchi, wheezing, or rubs  HEART: Regular rate and rhythm; No murmurs, rubs, or gallops  ABDOMEN: Soft, Nontender, Nondistended; Bowel sounds present  EXTREMITIES:  2+ Peripheral Pulses, No clubbing, cyanosis, or edema  SKIN: Right radial cath site without bleeding or hematoma  LABS:                        13.2   6.3   )-----------( 192      ( 28 Sep 2017 23:24 )             40.1     09-28    141  |  105  |  17  ----------------------------<  110<H>  3.5   |  27  |  0.76    Ca    9.1      28 Sep 2017 23:24    TPro  6.8  /  Alb  3.7  /  TBili  0.3  /  DBili  x   /  AST  21  /  ALT  37  /  AlkPhos  61  09-28    PT/INR - ( 28 Sep 2017 23:24 )   PT: 11.5 sec;   INR: 1.06 ratio         PTT - ( 28 Sep 2017 23:24 )  PTT:32.5 sec

## 2017-09-29 NOTE — H&P ADULT - PROBLEM SELECTOR PLAN 1
Admit to 2C telemetry: Dr. Elizabeth  continue ASA  heparin gtt  continue toprol  pre-op work-up: echo/carotids/lab work

## 2017-09-29 NOTE — DISCHARGE NOTE ADULT - CARE PROVIDERS DIRECT ADDRESSES
[email or web address]

## 2017-09-29 NOTE — H&P ADULT - HISTORY OF PRESENT ILLNESS
68 yo female PMH CAD, MI, s/p 10 stents in 4 ocassions, last one was in 2012, from [place] and visiting son since yesterday after 6 week trip in [place], presents c/o chest heaviness since last night around 9.30 pm while she was reading, starting on right and radiating to left chest and right jaw.   Her symptoms relieved with 2 sublingual NTG and 4 baby aspirin.  Pt states her anginal equivalent for CP is right sided jaw pain but has never felt pain radiating to left chest.  +DrDenies pleuritic component to CP.  No associated SOB, sweating, palpitation.  Had pain to achilles area of left ankle during trip in [place] which she thought was musculoskeletal.  Denies dizziness, fainting.  She denies any chest pain at this time.

## 2017-09-29 NOTE — CONSULT NOTE ADULT - SUBJECTIVE AND OBJECTIVE BOX
CHIEF COMPLAINT: Chest pain    HPI: 67 year old woman with a history of CAD s/p ~ 10 coronary stents (2005 and ~ 2011), prior MI, HTN, hyperlipidemia, remote breast cancer, former tobacco use presented to the ER by EMS last night complaining of angina.  She lives in [place] and has been visiting family in [place] since returning from a  vacation 1 week ago.  She was in her usual state of health until last night when she experienced moderately severe discomfort in a band-like distribution across her chest with radiation to the right aspect of her jaw -- shared some characteristics of her chronic stable angina but occurred at rest and did not improve with 1 SL NTG which is unusual for her.  Symptoms ultimately resolved following a 2nd NTG given to her by EMS.  She presently feels well; no complaints.  Her typical angina is brought on by exertion and relieved quickly with rest or NTG.    PAST MEDICAL & SURGICAL HISTORY:  MI (myocardial infarction)  CAD (coronary artery disease)  History of heart artery stent  Appendectomy      SOCIAL HISTORY   Smoking: Former smoker  Marital Status:     FAMILY HISTORY:   Grandparent with CAD    MEDICATIONS  (STANDING):  enoxaparin Injectable 40 milliGRAM(s) SubCutaneous every 24 hours  sodium chloride 0.9%. 1000 milliLiter(s) (70 mL/Hr) IV Continuous <Continuous>  metoprolol 25 milliGRAM(s) Oral every 12 hours  aspirin 325 milliGRAM(s) Oral daily    MEDICATIONS  (PRN):  acetaminophen   Tablet 650 milliGRAM(s) Oral every 6 hours PRN For Temp greater than 38.5 C (101.3 F)    Allergies: Percocet 10/325 (Unknown)    REVIEW OF SYSTEMS:  CONSTITUTIONAL: No weakness, fevers or chills  EYES/ENT: No visual changes;  No vertigo or throat pain   NECK: No pain or stiffness  RESPIRATORY: No cough, wheezing, hemoptysis; No shortness of breath  CARDIOVASCULAR: + chest pain (see HPI)  GASTROINTESTINAL: No abdominal pain. No nausea, vomiting, or hematemesis; No diarrhea or constipation. No melena or hematochezia.  GENITOURINARY: No dysuria, frequency or hematuria  NEUROLOGICAL: No numbness or weakness  SKIN: No itching or rash  All other review of systems is negative unless indicated above    VITAL SIGNS:   Vital Signs Last 24 Hrs  T(C): 37.2 (29 Sep 2017 10:45), Max: 37.2 (29 Sep 2017 10:45)  T(F): 99 (29 Sep 2017 10:45), Max: 99 (29 Sep 2017 10:45)  HR: 56 (29 Sep 2017 10:45) (48 - 59)  BP: 141/68 (29 Sep 2017 10:45) (135/63 - 177/78)  RR: 18 (29 Sep 2017 10:45) (16 - 18)  SpO2: 100% (29 Sep 2017 10:45) (98% - 100%)    PHYSICAL EXAM:  Constitutional: NAD, awake and alert, well-developed  Eyes:  EOMI,  Pupils round, No oral cyanosis.  Pulmonary: Non-labored, breath sounds are clear bilaterally, No wheezing, rales or rhonchi  Cardiovascular: S1 and S2, regular rate and rhythm, no Murmurs, gallops or rubs  Gastrointestinal: Bowel Sounds present, soft, nontender.   Lymph: No peripheral edema. No cervical lymphadenopathy.  Neurological: Alert, no focal deficits  Skin: No rashes.  Psych:  Mood & affect appropriate    LABS:                     13.2   6.3   )-----------( 192      ( 28 Sep 2017 23:24 )             40.1     141    |  105    |  17     ----------------------------<  110    3.5     |  27     |  0.76     Ca    9.1        28 Sep 2017 23:24  TPro  6.8    /  Alb  3.7    /  TBili  0.3    /  DBili  x      /  AST  21     /  ALT  37     /  AlkPhos  61     28 Sep 2017 23:24  PT/INR - ( 28 Sep 2017 23:24 )   PT: 11.5 sec;   INR: 1.06 ratio    PTT - ( 28 Sep 2017 23:24 )  PTT:32.5 sec    CARDIAC MARKERS ( 29 Sep 2017 05:40 ) 0.060 ng/mL / x     / x     / x     / x      CARDIAC MARKERS ( 29 Sep 2017 02:24 ) 0.078 ng/mL / x     / 36 U/L / x     / x      CARDIAC MARKERS ( 28 Sep 2017 23:24 ) <0.015 ng/mL / x     / 41 U/L / x     / x        09-28 @ 23:24  Pro Bnp 190    ECG: Normal sinus rhythm, inferior infarct

## 2017-09-30 LAB
APTT BLD: 73 SEC — HIGH (ref 27.5–37.4)
APTT BLD: 73.5 SEC — HIGH (ref 27.5–37.4)
APTT BLD: 74.5 SEC — HIGH (ref 27.5–37.4)
BLD GP AB SCN SERPL QL: NEGATIVE — SIGNIFICANT CHANGE UP
CHOLEST SERPL-MCNC: 128 MG/DL — SIGNIFICANT CHANGE UP (ref 10–199)
HBA1C BLD-MCNC: 5.7 % — HIGH (ref 4–5.6)
HCT VFR BLD CALC: 42.7 % — SIGNIFICANT CHANGE UP (ref 34.5–45)
HDLC SERPL-MCNC: 30 MG/DL — LOW (ref 40–125)
HGB BLD-MCNC: 14.3 G/DL — SIGNIFICANT CHANGE UP (ref 11.5–15.5)
INR BLD: 1.09 RATIO — SIGNIFICANT CHANGE UP (ref 0.88–1.16)
LIPID PNL WITH DIRECT LDL SERPL: 43 MG/DL — SIGNIFICANT CHANGE UP
MCHC RBC-ENTMCNC: 29.3 PG — SIGNIFICANT CHANGE UP (ref 27–34)
MCHC RBC-ENTMCNC: 33.5 GM/DL — SIGNIFICANT CHANGE UP (ref 32–36)
MCV RBC AUTO: 87.4 FL — SIGNIFICANT CHANGE UP (ref 80–100)
MRSA PCR RESULT.: SIGNIFICANT CHANGE UP
NT-PROBNP SERPL-SCNC: 437 PG/ML — HIGH (ref 0–300)
PLATELET # BLD AUTO: 220 K/UL — SIGNIFICANT CHANGE UP (ref 150–400)
PROTHROM AB SERPL-ACNC: 11.8 SEC — SIGNIFICANT CHANGE UP (ref 9.8–12.7)
RBC # BLD: 4.89 M/UL — SIGNIFICANT CHANGE UP (ref 3.8–5.2)
RBC # FLD: 13.5 % — SIGNIFICANT CHANGE UP (ref 10.3–14.5)
RH IG SCN BLD-IMP: POSITIVE — SIGNIFICANT CHANGE UP
S AUREUS DNA NOSE QL NAA+PROBE: DETECTED
TOTAL CHOLESTEROL/HDL RATIO MEASUREMENT: 4.3 RATIO — SIGNIFICANT CHANGE UP (ref 3.3–7.1)
TRIGL SERPL-MCNC: 274 MG/DL — HIGH (ref 10–149)
TSH SERPL-MCNC: 4.52 UIU/ML — HIGH (ref 0.27–4.2)
WBC # BLD: 6 K/UL — SIGNIFICANT CHANGE UP (ref 3.8–10.5)
WBC # FLD AUTO: 6 K/UL — SIGNIFICANT CHANGE UP (ref 3.8–10.5)

## 2017-09-30 PROCEDURE — 93306 TTE W/DOPPLER COMPLETE: CPT | Mod: 26

## 2017-09-30 PROCEDURE — 93880 EXTRACRANIAL BILAT STUDY: CPT | Mod: 26

## 2017-09-30 PROCEDURE — 99233 SBSQ HOSP IP/OBS HIGH 50: CPT

## 2017-09-30 RX ORDER — HEPARIN SODIUM 5000 [USP'U]/ML
750 INJECTION INTRAVENOUS; SUBCUTANEOUS
Qty: 25000 | Refills: 0 | Status: DISCONTINUED | OUTPATIENT
Start: 2017-09-30 | End: 2017-10-02

## 2017-09-30 RX ORDER — MUPIROCIN 20 MG/G
1 OINTMENT TOPICAL
Qty: 0 | Refills: 0 | Status: COMPLETED | OUTPATIENT
Start: 2017-09-30 | End: 2017-10-05

## 2017-09-30 RX ORDER — HEPARIN SODIUM 5000 [USP'U]/ML
4400 INJECTION INTRAVENOUS; SUBCUTANEOUS EVERY 6 HOURS
Qty: 0 | Refills: 0 | Status: DISCONTINUED | OUTPATIENT
Start: 2017-09-30 | End: 2017-10-02

## 2017-09-30 RX ORDER — INFLUENZA VIRUS VACCINE 15; 15; 15; 15 UG/.5ML; UG/.5ML; UG/.5ML; UG/.5ML
0.5 SUSPENSION INTRAMUSCULAR ONCE
Qty: 0 | Refills: 0 | Status: COMPLETED | OUTPATIENT
Start: 2017-09-30 | End: 2017-10-07

## 2017-09-30 RX ORDER — OXYBUTYNIN CHLORIDE 5 MG
15 TABLET ORAL AT BEDTIME
Qty: 0 | Refills: 0 | Status: DISCONTINUED | OUTPATIENT
Start: 2017-09-30 | End: 2017-10-07

## 2017-09-30 RX ORDER — ALPRAZOLAM 0.25 MG
0.25 TABLET ORAL THREE TIMES A DAY
Qty: 0 | Refills: 0 | Status: DISCONTINUED | OUTPATIENT
Start: 2017-09-30 | End: 2017-10-07

## 2017-09-30 RX ORDER — ACETAMINOPHEN 500 MG
650 TABLET ORAL EVERY 6 HOURS
Qty: 0 | Refills: 0 | Status: DISCONTINUED | OUTPATIENT
Start: 2017-09-30 | End: 2017-10-07

## 2017-09-30 RX ORDER — METOPROLOL TARTRATE 50 MG
25 TABLET ORAL
Qty: 0 | Refills: 0 | Status: DISCONTINUED | OUTPATIENT
Start: 2017-09-30 | End: 2017-10-01

## 2017-09-30 RX ORDER — GABAPENTIN 400 MG/1
200 CAPSULE ORAL DAILY
Qty: 0 | Refills: 0 | Status: DISCONTINUED | OUTPATIENT
Start: 2017-09-30 | End: 2017-10-06

## 2017-09-30 RX ORDER — NITROGLYCERIN 6.5 MG
1 CAPSULE, EXTENDED RELEASE ORAL ONCE
Qty: 0 | Refills: 0 | Status: COMPLETED | OUTPATIENT
Start: 2017-09-30 | End: 2017-09-30

## 2017-09-30 RX ORDER — AMLODIPINE BESYLATE 2.5 MG/1
5 TABLET ORAL DAILY
Qty: 0 | Refills: 0 | Status: DISCONTINUED | OUTPATIENT
Start: 2017-09-30 | End: 2017-10-02

## 2017-09-30 RX ADMIN — Medication 1 INCH(S): at 22:00

## 2017-09-30 RX ADMIN — HEPARIN SODIUM 4100 UNIT(S): 5000 INJECTION INTRAVENOUS; SUBCUTANEOUS at 00:32

## 2017-09-30 RX ADMIN — Medication 15 MILLIGRAM(S): at 22:07

## 2017-09-30 RX ADMIN — MUPIROCIN 1 APPLICATION(S): 20 OINTMENT TOPICAL at 17:25

## 2017-09-30 RX ADMIN — ATORVASTATIN CALCIUM 80 MILLIGRAM(S): 80 TABLET, FILM COATED ORAL at 22:08

## 2017-09-30 RX ADMIN — HEPARIN SODIUM 750 UNIT(S)/HR: 5000 INJECTION INTRAVENOUS; SUBCUTANEOUS at 07:40

## 2017-09-30 RX ADMIN — Medication 81 MILLIGRAM(S): at 12:21

## 2017-09-30 RX ADMIN — HEPARIN SODIUM UNIT(S)/HR: 5000 INJECTION INTRAVENOUS; SUBCUTANEOUS at 15:41

## 2017-09-30 RX ADMIN — AMLODIPINE BESYLATE 5 MILLIGRAM(S): 2.5 TABLET ORAL at 08:48

## 2017-09-30 RX ADMIN — PANTOPRAZOLE SODIUM 40 MILLIGRAM(S): 20 TABLET, DELAYED RELEASE ORAL at 06:09

## 2017-09-30 RX ADMIN — Medication 650 MILLIGRAM(S): at 22:01

## 2017-09-30 RX ADMIN — HEPARIN SODIUM 750 UNIT(S)/HR: 5000 INJECTION INTRAVENOUS; SUBCUTANEOUS at 22:50

## 2017-09-30 RX ADMIN — SODIUM CHLORIDE 3 MILLILITER(S): 9 INJECTION INTRAMUSCULAR; INTRAVENOUS; SUBCUTANEOUS at 05:07

## 2017-09-30 RX ADMIN — SODIUM CHLORIDE 3 MILLILITER(S): 9 INJECTION INTRAMUSCULAR; INTRAVENOUS; SUBCUTANEOUS at 22:13

## 2017-09-30 RX ADMIN — SODIUM CHLORIDE 3 MILLILITER(S): 9 INJECTION INTRAMUSCULAR; INTRAVENOUS; SUBCUTANEOUS at 15:42

## 2017-09-30 RX ADMIN — Medication 650 MILLIGRAM(S): at 22:35

## 2017-09-30 RX ADMIN — HEPARIN SODIUM 800 UNIT(S)/HR: 5000 INJECTION INTRAVENOUS; SUBCUTANEOUS at 00:31

## 2017-09-30 NOTE — PROGRESS NOTE ADULT - ASSESSMENT
68 YO F, former [employer] ,  with pmh of CAD s/p ~ 10 coronary stents (2005 and ~ 2011), prior MI, HTN, hyperlipidemia, Breast CA (1993) s/p left mastectomy and chemo, former tobacco use presented to ER via ambulance with c/o chest pain.   Patient prompted spouse to call EMS    9/29 She underwent cardiac cath at [facility] which revealed 2 Vessel CAD with ostial LAD and LCX disease (instent restenosis) and was transferred to [facility] for further management.   9/30 ECHO ordered for today on  heparin gtt  Dr Elizabeth at  bedside all questions answered 66 YO F, former [employer] ,  with pmh of CAD s/p ~ 10 coronary stents (2005 and ~ 2011), PAF, prior MI, HTN, hyperlipidemia, Breast CA (1993) s/p left mastectomy and chemo, former tobacco use presented to ER via ambulance with c/o chest pain.   Patient prompted spouse to call EMS    9/29 She underwent cardiac cath at [facility] which revealed 2 Vessel CAD with ostial LAD and LCX disease (instent restenosis) and was transferred to [facility] for further management.   9/30 ECHO ordered for today on  heparin gtt  Dr Elizabeth at  bedside all questions answered

## 2017-10-01 DIAGNOSIS — I21.11 ST ELEVATION (STEMI) MYOCARDIAL INFARCTION INVOLVING RIGHT CORONARY ARTERY: ICD-10-CM

## 2017-10-01 LAB
ALBUMIN SERPL ELPH-MCNC: 4.3 G/DL — SIGNIFICANT CHANGE UP (ref 3.3–5)
ALP SERPL-CCNC: 64 U/L — SIGNIFICANT CHANGE UP (ref 40–120)
ALT FLD-CCNC: 35 U/L RC — SIGNIFICANT CHANGE UP (ref 10–45)
ANION GAP SERPL CALC-SCNC: 15 MMOL/L — SIGNIFICANT CHANGE UP (ref 5–17)
ANION GAP SERPL CALC-SCNC: 17 MMOL/L — SIGNIFICANT CHANGE UP (ref 5–17)
APTT BLD: 34.4 SEC — SIGNIFICANT CHANGE UP (ref 27.5–37.4)
APTT BLD: 44.2 SEC — HIGH (ref 27.5–37.4)
APTT BLD: 54 SEC — HIGH (ref 27.5–37.4)
APTT BLD: 83.4 SEC — HIGH (ref 27.5–37.4)
AST SERPL-CCNC: 32 U/L — SIGNIFICANT CHANGE UP (ref 10–40)
BILIRUB SERPL-MCNC: 0.3 MG/DL — SIGNIFICANT CHANGE UP (ref 0.2–1.2)
BUN SERPL-MCNC: 15 MG/DL — SIGNIFICANT CHANGE UP (ref 7–23)
BUN SERPL-MCNC: 20 MG/DL — SIGNIFICANT CHANGE UP (ref 7–23)
CALCIUM SERPL-MCNC: 10.1 MG/DL — SIGNIFICANT CHANGE UP (ref 8.4–10.5)
CALCIUM SERPL-MCNC: 9.8 MG/DL — SIGNIFICANT CHANGE UP (ref 8.4–10.5)
CHLORIDE SERPL-SCNC: 101 MMOL/L — SIGNIFICANT CHANGE UP (ref 96–108)
CHLORIDE SERPL-SCNC: 102 MMOL/L — SIGNIFICANT CHANGE UP (ref 96–108)
CK MB BLD-MCNC: 3.4 % — SIGNIFICANT CHANGE UP (ref 0–3.5)
CK MB CFR SERPL CALC: 1.3 NG/ML — SIGNIFICANT CHANGE UP (ref 0–3.8)
CK SERPL-CCNC: 38 U/L — SIGNIFICANT CHANGE UP (ref 25–170)
CO2 SERPL-SCNC: 22 MMOL/L — SIGNIFICANT CHANGE UP (ref 22–31)
CO2 SERPL-SCNC: 26 MMOL/L — SIGNIFICANT CHANGE UP (ref 22–31)
CREAT SERPL-MCNC: 0.77 MG/DL — SIGNIFICANT CHANGE UP (ref 0.5–1.3)
CREAT SERPL-MCNC: 0.96 MG/DL — SIGNIFICANT CHANGE UP (ref 0.5–1.3)
GLUCOSE SERPL-MCNC: 111 MG/DL — HIGH (ref 70–99)
GLUCOSE SERPL-MCNC: 97 MG/DL — SIGNIFICANT CHANGE UP (ref 70–99)
HCT VFR BLD CALC: 40 % — SIGNIFICANT CHANGE UP (ref 34.5–45)
HCT VFR BLD CALC: 43.6 % — SIGNIFICANT CHANGE UP (ref 34.5–45)
HGB BLD-MCNC: 14.1 G/DL — SIGNIFICANT CHANGE UP (ref 11.5–15.5)
HGB BLD-MCNC: 14.7 G/DL — SIGNIFICANT CHANGE UP (ref 11.5–15.5)
INR BLD: 1.02 RATIO — SIGNIFICANT CHANGE UP (ref 0.88–1.16)
INR BLD: 1.11 RATIO — SIGNIFICANT CHANGE UP (ref 0.88–1.16)
MCHC RBC-ENTMCNC: 29.5 PG — SIGNIFICANT CHANGE UP (ref 27–34)
MCHC RBC-ENTMCNC: 30.8 PG — SIGNIFICANT CHANGE UP (ref 27–34)
MCHC RBC-ENTMCNC: 33.8 GM/DL — SIGNIFICANT CHANGE UP (ref 32–36)
MCHC RBC-ENTMCNC: 35.1 GM/DL — SIGNIFICANT CHANGE UP (ref 32–36)
MCV RBC AUTO: 87.4 FL — SIGNIFICANT CHANGE UP (ref 80–100)
MCV RBC AUTO: 87.8 FL — SIGNIFICANT CHANGE UP (ref 80–100)
PA ADP PRP-ACNC: 191 PRU — LOW (ref 194–417)
PLATELET # BLD AUTO: 188 K/UL — SIGNIFICANT CHANGE UP (ref 150–400)
PLATELET # BLD AUTO: 217 K/UL — SIGNIFICANT CHANGE UP (ref 150–400)
POTASSIUM SERPL-MCNC: 3.8 MMOL/L — SIGNIFICANT CHANGE UP (ref 3.5–5.3)
POTASSIUM SERPL-MCNC: 4 MMOL/L — SIGNIFICANT CHANGE UP (ref 3.5–5.3)
POTASSIUM SERPL-SCNC: 3.8 MMOL/L — SIGNIFICANT CHANGE UP (ref 3.5–5.3)
POTASSIUM SERPL-SCNC: 4 MMOL/L — SIGNIFICANT CHANGE UP (ref 3.5–5.3)
PROT SERPL-MCNC: 7.4 G/DL — SIGNIFICANT CHANGE UP (ref 6–8.3)
PROTHROM AB SERPL-ACNC: 11 SEC — SIGNIFICANT CHANGE UP (ref 9.8–12.7)
PROTHROM AB SERPL-ACNC: 12.1 SEC — SIGNIFICANT CHANGE UP (ref 9.8–12.7)
RBC # BLD: 4.56 M/UL — SIGNIFICANT CHANGE UP (ref 3.8–5.2)
RBC # BLD: 4.99 M/UL — SIGNIFICANT CHANGE UP (ref 3.8–5.2)
RBC # FLD: 13.5 % — SIGNIFICANT CHANGE UP (ref 10.3–14.5)
RBC # FLD: 13.6 % — SIGNIFICANT CHANGE UP (ref 10.3–14.5)
SODIUM SERPL-SCNC: 140 MMOL/L — SIGNIFICANT CHANGE UP (ref 135–145)
SODIUM SERPL-SCNC: 143 MMOL/L — SIGNIFICANT CHANGE UP (ref 135–145)
TROPONIN T SERPL-MCNC: <0.01 NG/ML — SIGNIFICANT CHANGE UP (ref 0–0.06)
WBC # BLD: 5 K/UL — SIGNIFICANT CHANGE UP (ref 3.8–10.5)
WBC # BLD: 7.1 K/UL — SIGNIFICANT CHANGE UP (ref 3.8–10.5)
WBC # FLD AUTO: 5 K/UL — SIGNIFICANT CHANGE UP (ref 3.8–10.5)
WBC # FLD AUTO: 7.1 K/UL — SIGNIFICANT CHANGE UP (ref 3.8–10.5)

## 2017-10-01 PROCEDURE — 92941 PRQ TRLML REVSC TOT OCCL AMI: CPT | Mod: LD

## 2017-10-01 PROCEDURE — 93010 ELECTROCARDIOGRAM REPORT: CPT | Mod: 76

## 2017-10-01 PROCEDURE — 93458 L HRT ARTERY/VENTRICLE ANGIO: CPT | Mod: 26,59

## 2017-10-01 PROCEDURE — 92928 PRQ TCAT PLMT NTRAC ST 1 LES: CPT | Mod: LC

## 2017-10-01 PROCEDURE — 99152 MOD SED SAME PHYS/QHP 5/>YRS: CPT

## 2017-10-01 PROCEDURE — 92960 CARDIOVERSION ELECTRIC EXT: CPT

## 2017-10-01 PROCEDURE — 33967 INSERT I-AORT PERCUT DEVICE: CPT

## 2017-10-01 RX ORDER — METOPROLOL TARTRATE 50 MG
12.5 TABLET ORAL
Qty: 0 | Refills: 0 | Status: DISCONTINUED | OUTPATIENT
Start: 2017-10-01 | End: 2017-10-02

## 2017-10-01 RX ORDER — CEFUROXIME AXETIL 250 MG
1500 TABLET ORAL ONCE
Qty: 0 | Refills: 0 | Status: DISCONTINUED | OUTPATIENT
Start: 2017-10-02 | End: 2017-10-02

## 2017-10-01 RX ORDER — NITROGLYCERIN 6.5 MG
0.4 CAPSULE, EXTENDED RELEASE ORAL
Qty: 0 | Refills: 0 | Status: DISCONTINUED | OUTPATIENT
Start: 2017-10-01 | End: 2017-10-02

## 2017-10-01 RX ORDER — CHLORHEXIDINE GLUCONATE 213 G/1000ML
1 SOLUTION TOPICAL ONCE
Qty: 0 | Refills: 0 | Status: COMPLETED | OUTPATIENT
Start: 2017-10-01 | End: 2017-10-01

## 2017-10-01 RX ORDER — CHLORHEXIDINE GLUCONATE 213 G/1000ML
15 SOLUTION TOPICAL ONCE
Qty: 0 | Refills: 0 | Status: DISCONTINUED | OUTPATIENT
Start: 2017-10-01 | End: 2017-10-02

## 2017-10-01 RX ORDER — NITROGLYCERIN 6.5 MG
50 CAPSULE, EXTENDED RELEASE ORAL
Qty: 50 | Refills: 0 | Status: DISCONTINUED | OUTPATIENT
Start: 2017-10-01 | End: 2017-10-02

## 2017-10-01 RX ORDER — MORPHINE SULFATE 50 MG/1
2 CAPSULE, EXTENDED RELEASE ORAL ONCE
Qty: 0 | Refills: 0 | Status: DISCONTINUED | OUTPATIENT
Start: 2017-10-01 | End: 2017-10-01

## 2017-10-01 RX ORDER — HEPARIN SODIUM 5000 [USP'U]/ML
4000 INJECTION INTRAVENOUS; SUBCUTANEOUS ONCE
Qty: 0 | Refills: 0 | Status: COMPLETED | OUTPATIENT
Start: 2017-10-01 | End: 2017-10-01

## 2017-10-01 RX ADMIN — MUPIROCIN 1 APPLICATION(S): 20 OINTMENT TOPICAL at 05:41

## 2017-10-01 RX ADMIN — SODIUM CHLORIDE 3 MILLILITER(S): 9 INJECTION INTRAMUSCULAR; INTRAVENOUS; SUBCUTANEOUS at 05:41

## 2017-10-01 RX ADMIN — HEPARIN SODIUM 650 UNIT(S)/HR: 5000 INJECTION INTRAVENOUS; SUBCUTANEOUS at 07:05

## 2017-10-01 RX ADMIN — Medication 1 INCH(S): at 14:01

## 2017-10-01 RX ADMIN — Medication 81 MILLIGRAM(S): at 12:28

## 2017-10-01 RX ADMIN — CHLORHEXIDINE GLUCONATE 1 APPLICATION(S): 213 SOLUTION TOPICAL at 21:59

## 2017-10-01 RX ADMIN — Medication 15 MILLIGRAM(S): at 22:00

## 2017-10-01 RX ADMIN — ATORVASTATIN CALCIUM 80 MILLIGRAM(S): 80 TABLET, FILM COATED ORAL at 22:01

## 2017-10-01 RX ADMIN — MORPHINE SULFATE 2 MILLIGRAM(S): 50 CAPSULE, EXTENDED RELEASE ORAL at 22:34

## 2017-10-01 RX ADMIN — HEPARIN SODIUM 650 UNIT(S)/HR: 5000 INJECTION INTRAVENOUS; SUBCUTANEOUS at 14:01

## 2017-10-01 RX ADMIN — GABAPENTIN 200 MILLIGRAM(S): 400 CAPSULE ORAL at 12:28

## 2017-10-01 RX ADMIN — MORPHINE SULFATE 2 MILLIGRAM(S): 50 CAPSULE, EXTENDED RELEASE ORAL at 22:12

## 2017-10-01 RX ADMIN — MUPIROCIN 1 APPLICATION(S): 20 OINTMENT TOPICAL at 17:22

## 2017-10-01 RX ADMIN — HEPARIN SODIUM 800 UNIT(S)/HR: 5000 INJECTION INTRAVENOUS; SUBCUTANEOUS at 22:02

## 2017-10-01 RX ADMIN — AMLODIPINE BESYLATE 5 MILLIGRAM(S): 2.5 TABLET ORAL at 05:40

## 2017-10-01 RX ADMIN — MORPHINE SULFATE 2 MILLIGRAM(S): 50 CAPSULE, EXTENDED RELEASE ORAL at 22:30

## 2017-10-01 RX ADMIN — MORPHINE SULFATE 2 MILLIGRAM(S): 50 CAPSULE, EXTENDED RELEASE ORAL at 22:45

## 2017-10-01 RX ADMIN — Medication 0.4 MILLIGRAM(S): at 22:32

## 2017-10-01 RX ADMIN — PANTOPRAZOLE SODIUM 40 MILLIGRAM(S): 20 TABLET, DELAYED RELEASE ORAL at 05:41

## 2017-10-01 RX ADMIN — SODIUM CHLORIDE 3 MILLILITER(S): 9 INJECTION INTRAMUSCULAR; INTRAVENOUS; SUBCUTANEOUS at 14:01

## 2017-10-01 RX ADMIN — Medication 12.5 MILLIGRAM(S): at 17:21

## 2017-10-01 RX ADMIN — SODIUM CHLORIDE 3 MILLILITER(S): 9 INJECTION INTRAMUSCULAR; INTRAVENOUS; SUBCUTANEOUS at 22:00

## 2017-10-01 RX ADMIN — Medication 12.5 MILLIGRAM(S): at 12:31

## 2017-10-01 RX ADMIN — HEPARIN SODIUM 4000 UNIT(S): 5000 INJECTION INTRAVENOUS; SUBCUTANEOUS at 22:30

## 2017-10-01 NOTE — PROGRESS NOTE ADULT - PROBLEM SELECTOR PLAN 1
STAT EKG  STAT Morphine 2mg x 2  Nitropaste 2 inches  Nitro sublingual x 2   Tridil gtt at 25mcg initated  oxygen via nasal cannula  Heparin 4000U bolus  transferred to CTU/cath lab for emergent IABP Received:   2mg IVP Morphine x 2  Nitro: 1 Inch nitro paste, 0.4 sublingual x2,  Tridil gtt at 25mcg  Heparin: On ACS nomogram at 800U/hr received 4000U bolus  Oxygen: via nasal cannula at 4L  STAT EKG  STAT CBC CMP PT INR CE  Transferred to CTU/Cath lab for emergent IABP

## 2017-10-01 NOTE — CHART NOTE - NSCHARTNOTEFT_GEN_A_CORE
Called by primary team for stat IABP.     67F w/extensive CAD hx p/w unstable angina to [facility] where she had a cardiac cath found to have 2vCAD and was transferrred to [facility] for planned CABG 10/2 with Dr. Elizabeth. She acutely developed substernal chest pain with acute EKG changes concerning for anterolateral STEMI.     Plan for emergent cath and IABP     Case d/w Dr. Perry and CTU team.

## 2017-10-01 NOTE — CONSULT NOTE ADULT - SUBJECTIVE AND OBJECTIVE BOX
Date of Admission: 10/1/17    Patient is a 67y old  Female who presents with a chief complaint of cardiac surgery (29 Sep 2017 22:45)      HISTORY OF PRESENT ILLNESS:     68 yo woman w/PMHx significant HTN, HLD, CAD hx s/p 10 PCI and breast Ca in remission presented to [facility] with substernal CP admitted for unstable angina with cardiac cath revealing 2vCAD ostial LAD and Lcx transferred to [facility] for 2vCABG. Cardiology consulted for acute onset crushing substernal 7/10 chest pain without radiation. Notably with diaphoresis. No other associated symptoms.            Allergies    No Known Allergies    Intolerances    oxycodone (Pruritus (Mild))  	    MEDICATIONS:  aspirin  chewable 81 milliGRAM(s) Oral daily  heparin  Injectable 4100 Unit(s) IV Push every 6 hours PRN  amLODIPine   Tablet 5 milliGRAM(s) Oral daily  heparin  Infusion. 750 Unit(s)/Hr IV Continuous <Continuous>  heparin  Injectable 4400 Unit(s) IV Push every 6 hours PRN  metoprolol 12.5 milliGRAM(s) Oral two times a day  heparin  Injectable 4000 Unit(s) IV Push once  nitroglycerin     SubLingual 0.4 milliGRAM(s) SubLingual every 5 minutes PRN  nitroglycerin  Infusion 50 MICROgram(s)/Min IV Continuous <Continuous>        gabapentin 200 milliGRAM(s) Oral daily  ALPRAZolam 0.25 milliGRAM(s) Oral three times a day PRN  acetaminophen   Tablet. 650 milliGRAM(s) Oral every 6 hours PRN  morphine  - Injectable 2 milliGRAM(s) IV Push once  morphine  - Injectable 2 milliGRAM(s) IV Push once    pantoprazole    Tablet 40 milliGRAM(s) Oral before breakfast    atorvastatin 80 milliGRAM(s) Oral at bedtime    oxybutynin XL 15 milliGRAM(s) Oral at bedtime  influenza   Vaccine 0.5 milliLiter(s) IntraMuscular once  mupirocin 2% Ointment 1 Application(s) Topical two times a day  chlorhexidine 0.12% Liquid 15 milliLiter(s) Swish and Spit once      PAST MEDICAL & SURGICAL HISTORY:  S/P chemotherapy, time since greater than 12 weeks  Malignant neoplasm of left female breast, unspecified estrogen receptor status, unspecified site of breast  Arthritis  MI (myocardial infarction)  CAD (coronary artery disease)  H/O mastectomy, left  History of heart artery stent      FAMILY HISTORY:  No pertinent family history in first degree relatives      SOCIAL HISTORY:    [ ] Non-smoker  [ ] Smoker  [ ] Alcohol      REVIEW OF SYSTEMS:  See HPI. Otherwise, 10 point ROS done and otherwise negative.    PHYSICAL EXAM:  T(C): 36.6 (10-01-17 @ 20:32), Max: 36.8 (10-01-17 @ 14:09)  HR: 62 (10-01-17 @ 20:32) (42 - 62)  BP: 128/74 (10-01-17 @ 20:32) (128/74 - 154/64)  RR: 18 (10-01-17 @ 20:32) (18 - 18)  SpO2: 95% (10-01-17 @ 20:32) (94% - 96%)  Wt(kg): --  I&O's Summary    30 Sep 2017 07:01  -  01 Oct 2017 07:00  --------------------------------------------------------  IN: 1119.5 mL / OUT: 2 mL / NET: 1117.5 mL    01 Oct 2017 07:01  -  01 Oct 2017 23:40  --------------------------------------------------------  IN: 1020 mL / OUT: 600 mL / NET: 420 mL        Appearance: Normal	  HEENT:   Normal oral mucosa, PERRL, EOMI	  Lymphatic: No lymphadenopathy  Cardiovascular: Normal S1 S2, No JVD, No murmurs, No edema  Respiratory: Lungs clear to auscultation	  Psychiatry: A & O x 3, Mood & affect appropriate  Gastrointestinal:  Soft, Non-tender, + BS	  Skin: No rashes, No ecchymoses, No cyanosis	  Neurologic: Non-focal  Extremities: Normal range of motion, No clubbing, cyanosis or edema  Vascular: Peripheral pulses palpable 2+ bilaterally        LABS:	 	    CBC Full  -  ( 01 Oct 2017 22:51 )  WBC Count : 7.1 K/uL  Hemoglobin : 14.7 g/dL  Hematocrit : 43.6 %  Platelet Count - Automated : 217 K/uL  Mean Cell Volume : 87.4 fl  Mean Cell Hemoglobin : 29.5 pg  Mean Cell Hemoglobin Concentration : 33.8 gm/dL  Auto Neutrophil # : x  Auto Lymphocyte # : x  Auto Monocyte # : x  Auto Eosinophil # : x  Auto Basophil # : x  Auto Neutrophil % : x  Auto Lymphocyte % : x  Auto Monocyte % : x  Auto Eosinophil % : x  Auto Basophil % : x    10-01    140  |  101  |  20  ----------------------------<  111<H>  4.0   |  22  |  0.96  10-01    143  |  102  |  15  ----------------------------<  97  3.8   |  26  |  0.77    Ca    10.1      01 Oct 2017 22:47  Ca    9.8      01 Oct 2017 05:21    TPro  7.4  /  Alb  4.3  /  TBili  0.3  /  DBili  x   /  AST  32  /  ALT  35  /  AlkPhos  64  10-01      proBNP:   Lipid Profile:   HgA1c:   TSH:       CARDIAC MARKERS:  Troponin I, Serum: 0.060 ng/mL (09-29 @ 05:40)  Troponin I, Serum: 0.078 ng/mL (09-29 @ 02:24)            TELEMETRY: 	    ECG:  	  RADIOLOGY:  OTHER: 	    PREVIOUS DIAGNOSTIC TESTING:    [ ] Echocardiogram:  [ ]  Catheterization:  [ ] Stress Test:  	  	  ASSESSMENT/PLAN: Date of Admission: 10/1/17    Patient is a 67y old  Female who presents with a chief complaint of cardiac surgery (29 Sep 2017 22:45)      HISTORY OF PRESENT ILLNESS:     68 yo woman w/PMHx significant HTN, HLD, CAD hx s/p 10 PCI and breast Ca in remission presented to [facility] with substernal CP admitted for unstable angina with cardiac cath revealing 2vCAD ostial LAD and Lcx transferred to [facility] for 2vCABG. Cardiology consulted for acute onset crushing substernal 7/10 chest pain without radiation. Notably with diaphoresis. No other associated symptoms.     She was transferred urgently to the cardiac cath lab.     Allergies    No Known Allergies    Intolerances    oxycodone (Pruritus (Mild))  	    MEDICATIONS:  aspirin  chewable 81 milliGRAM(s) Oral daily  heparin  Injectable 4100 Unit(s) IV Push every 6 hours PRN  amLODIPine   Tablet 5 milliGRAM(s) Oral daily  heparin  Infusion. 750 Unit(s)/Hr IV Continuous <Continuous>  heparin  Injectable 4400 Unit(s) IV Push every 6 hours PRN  metoprolol 12.5 milliGRAM(s) Oral two times a day  heparin  Injectable 4000 Unit(s) IV Push once  nitroglycerin     SubLingual 0.4 milliGRAM(s) SubLingual every 5 minutes PRN  nitroglycerin  Infusion 50 MICROgram(s)/Min IV Continuous <Continuous>        gabapentin 200 milliGRAM(s) Oral daily  ALPRAZolam 0.25 milliGRAM(s) Oral three times a day PRN  acetaminophen   Tablet. 650 milliGRAM(s) Oral every 6 hours PRN  morphine  - Injectable 2 milliGRAM(s) IV Push once  morphine  - Injectable 2 milliGRAM(s) IV Push once    pantoprazole    Tablet 40 milliGRAM(s) Oral before breakfast    atorvastatin 80 milliGRAM(s) Oral at bedtime    oxybutynin XL 15 milliGRAM(s) Oral at bedtime  influenza   Vaccine 0.5 milliLiter(s) IntraMuscular once  mupirocin 2% Ointment 1 Application(s) Topical two times a day  chlorhexidine 0.12% Liquid 15 milliLiter(s) Swish and Spit once      PAST MEDICAL & SURGICAL HISTORY:  S/P chemotherapy, time since greater than 12 weeks  Malignant neoplasm of left female breast, unspecified estrogen receptor status, unspecified site of breast  Arthritis  MI (myocardial infarction)  CAD (coronary artery disease)  H/O mastectomy, left  History of heart artery stent    FAMILY HISTORY:  No pertinent family history in first degree relatives    SOCIAL HISTORY:    [ ] Non-smoker    REVIEW OF SYSTEMS:  CONSTITUTIONAL: No weakness, fevers or chills  EYES/ENT: No visual changes;  No dysphagia  NECK: No pain or stiffness  RESPIRATORY: No cough, wheezing, hemoptysis; No shortness of breath  CARDIOVASCULAR: No palpitations; No lower extremity edema  GASTROINTESTINAL: No abdominal or epigastric pain. No nausea, vomiting, or hematemesis; No diarrhea or constipation. No melena or hematochezia.  BACK: No back pain  GENITOURINARY: No dysuria, frequency or hematuria  NEUROLOGICAL: No numbness or weakness  SKIN: No itching, burning, rashes, or lesions   All other review of systems is negative unless indicated above.    PHYSICAL EXAM:  T(C): 36.6 (10-01-17 @ 20:32), Max: 36.8 (10-01-17 @ 14:09)  HR: 62 (10-01-17 @ 20:32) (42 - 62)  BP: 128/74 (10-01-17 @ 20:32) (128/74 - 154/64)  RR: 18 (10-01-17 @ 20:32) (18 - 18)  SpO2: 95% (10-01-17 @ 20:32) (94% - 96%)  Wt(kg): --  I&O's Summary    30 Sep 2017 07:01  -  01 Oct 2017 07:00  --------------------------------------------------------  IN: 1119.5 mL / OUT: 2 mL / NET: 1117.5 mL    01 Oct 2017 07:01  -  01 Oct 2017 23:40  --------------------------------------------------------  IN: 1020 mL / OUT: 600 mL / NET: 420 mL        Appearance: In distress 2/2 pain, alert   HEENT:   MMM OP clear   Lymphatic: No lymphadenopathy  Cardiovascular: Normal S1 S2, No JVD, bradycardic, No edema  Respiratory: Lungs clear to auscultation 	  Psychiatry: A & O x 3, Mood & affect appropriate  Gastrointestinal:  Soft, Non-tender, + BS	  Skin: No rashes, No ecchymoses, No cyanosis	  Neurologic: Non-focal  Extremities: Normal range of motion, No clubbing, cyanosis or edema  Vascular: Peripheral pulses present      LABS:	 	    CBC Full  -  ( 01 Oct 2017 22:51 )  WBC Count : 7.1 K/uL  Hemoglobin : 14.7 g/dL  Hematocrit : 43.6 %  Platelet Count - Automated : 217 K/uL  Mean Cell Volume : 87.4 fl  Mean Cell Hemoglobin : 29.5 pg  Mean Cell Hemoglobin Concentration : 33.8 gm/dL    10-01    140  |  101  |  20  ----------------------------<  111<H>  4.0   |  22  |  0.96  10-01    143  |  102  |  15  ----------------------------<  97  3.8   |  26  |  0.77    Ca    10.1      01 Oct 2017 22:47  Ca    9.8      01 Oct 2017 05:21    TPro  7.4  /  Alb  4.3  /  TBili  0.3  /  DBili  x   /  AST  32  /  ALT  35  /  AlkPhos  64  10-01      CARDIAC MARKERS:  Troponin I, Serum: 0.060 ng/mL (09-29 @ 05:40)  Troponin I, Serum: 0.078 ng/mL (09-29 @ 02:24)      ECG:  	Sinus bradycardia with ST elevations V2-V6 with deep ST depressions in II, III, AVF    PREVIOUS DIAGNOSTIC TESTING:    [ ] Echocardiogram: < from: Transthoracic Echocardiogram (09.30.17 @ 22:08) >  Dimensions:    Normal Values:  LA:     3.5    2.0 - 4.0 cm  Ao:     2.6    2.0 - 3.8 cm  SEPTUM: 0.8    0.6 - 1.2 cm  PWT:    0.9    0.6 - 1.1cm  LVIDd:  4.4    3.0 - 5.6 cm  LVIDs:  2.3    1.8 - 4.0 cm  Derived variables:  LVMI: 67 g/m2  RWT: 0.40  Fractional short: 48 %  EF (Visual Estimate): 65 %    Conclusions:  1. Mild-moderate mitral regurgitation.  2. Normal left ventricular internal dimensions and wall  thicknesses.  3. Normal left ventricular systolic function. No segmental  wall motion abnormalities.  4. Normal diastolic function  5. Normal right ventricular size and function.    [ ]  Catheterization: < from: Cardiac Cath Lab - Adult (09.29.17 @ 16:41) >  LMCA: Diffuse irregularity.There is mild diffuse disease noted.    LAD: Diffuse irregularity.     Prox LAD: Ostial.90% stenosis 12 mm length.Pre procedure RUSLAN III flow   was   noted. The lesion was diagnosed as a moderate risk lesion.     The lesion was previously treated with the following devices: drug   eluting   stent.     Prox LAD: Distal subsection.0% stenosis 20 mm length.Pre procedure RUSLAN   III flow was noted. The lesion was diagnosed as a low risk lesion.    LCx: Diffuse irregularity.     Prox CX: Ostial.60% stenosis 12 mm length.Pre procedure RUSLAN III flow was   noted. Good runoff was present.The lesion was diagnosed as a moderate   risk   lesion.     The lesion was previously treated with the following devices: drug   eluting   stent.     1st Ob Winter: Proximal subsection.100% stenosis 20 mm length.Pre procedure   RUSLAN 0 flowwas noted. The lesion was diagnosed as a high risk lesion.     The lesion was previously treated with the following devices: drug   eluting   stent.    RCA: Diffuse irregularity.There is mild diffuse disease noted.      ASSESSMENT/PLAN: 	  67F w.HTN, HLD, CAD hx s/p 10 PCI and breast Ca in remission presented to [facility] with substernal CP admitted for unstable angina with cardiac cath revealing 2vCAD ostial LAD and Lcx transferred to [facility] for 2vCABG course complicated by acute CP with anterolateral STEMI found to have significant occlusion of pLAD now s/p PCI.     #Chest pain w/significant CAD hx and ECG changes with anterolateral STEMI; [facility] demonstrated pLAD occlusion now s/p PCI--During the procedure she had 2 episodes of Vfib shocked 2x (she received 2 shocks Date of Admission: 10/1/17    Patient is a 67y old  Female who presents with a chief complaint of cardiac surgery (29 Sep 2017 22:45)      HISTORY OF PRESENT ILLNESS:     68 yo woman w/PMHx significant HTN, HLD, CAD hx s/p 10 PCI and breast Ca in remission presented to [facility] with substernal CP admitted for unstable angina with cardiac cath revealing 2vCAD ostial LAD and Lcx transferred to [facility] for 2vCABG. Cardiology consulted for acute onset crushing substernal 7/10 chest pain without radiation. Notably with diaphoresis. No other associated symptoms.     She was transferred urgently to the cardiac cath lab.     Allergies    No Known Allergies    Intolerances    oxycodone (Pruritus (Mild))  	    MEDICATIONS:  aspirin  chewable 81 milliGRAM(s) Oral daily  heparin  Injectable 4100 Unit(s) IV Push every 6 hours PRN  amLODIPine   Tablet 5 milliGRAM(s) Oral daily  heparin  Infusion. 750 Unit(s)/Hr IV Continuous <Continuous>  heparin  Injectable 4400 Unit(s) IV Push every 6 hours PRN  metoprolol 12.5 milliGRAM(s) Oral two times a day  heparin  Injectable 4000 Unit(s) IV Push once  nitroglycerin     SubLingual 0.4 milliGRAM(s) SubLingual every 5 minutes PRN  nitroglycerin  Infusion 50 MICROgram(s)/Min IV Continuous <Continuous>        gabapentin 200 milliGRAM(s) Oral daily  ALPRAZolam 0.25 milliGRAM(s) Oral three times a day PRN  acetaminophen   Tablet. 650 milliGRAM(s) Oral every 6 hours PRN  morphine  - Injectable 2 milliGRAM(s) IV Push once  morphine  - Injectable 2 milliGRAM(s) IV Push once    pantoprazole    Tablet 40 milliGRAM(s) Oral before breakfast    atorvastatin 80 milliGRAM(s) Oral at bedtime    oxybutynin XL 15 milliGRAM(s) Oral at bedtime  influenza   Vaccine 0.5 milliLiter(s) IntraMuscular once  mupirocin 2% Ointment 1 Application(s) Topical two times a day  chlorhexidine 0.12% Liquid 15 milliLiter(s) Swish and Spit once      PAST MEDICAL & SURGICAL HISTORY:  S/P chemotherapy, time since greater than 12 weeks  Malignant neoplasm of left female breast, unspecified estrogen receptor status, unspecified site of breast  Arthritis  MI (myocardial infarction)  CAD (coronary artery disease)  H/O mastectomy, left  History of heart artery stent    FAMILY HISTORY:  No pertinent family history in first degree relatives    SOCIAL HISTORY:    [ ] Non-smoker    REVIEW OF SYSTEMS:  CONSTITUTIONAL: No weakness, fevers or chills  EYES/ENT: No visual changes;  No dysphagia  NECK: No pain or stiffness  RESPIRATORY: No cough, wheezing, hemoptysis; No shortness of breath  CARDIOVASCULAR: No palpitations; No lower extremity edema  GASTROINTESTINAL: No abdominal or epigastric pain. No nausea, vomiting, or hematemesis; No diarrhea or constipation. No melena or hematochezia.  BACK: No back pain  GENITOURINARY: No dysuria, frequency or hematuria  NEUROLOGICAL: No numbness or weakness  SKIN: No itching, burning, rashes, or lesions   All other review of systems is negative unless indicated above.    PHYSICAL EXAM:  T(C): 36.6 (10-01-17 @ 20:32), Max: 36.8 (10-01-17 @ 14:09)  HR: 62 (10-01-17 @ 20:32) (42 - 62)  BP: 128/74 (10-01-17 @ 20:32) (128/74 - 154/64)  RR: 18 (10-01-17 @ 20:32) (18 - 18)  SpO2: 95% (10-01-17 @ 20:32) (94% - 96%)  Wt(kg): --  I&O's Summary    30 Sep 2017 07:01  -  01 Oct 2017 07:00  --------------------------------------------------------  IN: 1119.5 mL / OUT: 2 mL / NET: 1117.5 mL    01 Oct 2017 07:01  -  01 Oct 2017 23:40  --------------------------------------------------------  IN: 1020 mL / OUT: 600 mL / NET: 420 mL        Appearance: In distress 2/2 pain, alert   HEENT:   MMM OP clear   Lymphatic: No lymphadenopathy  Cardiovascular: Normal S1 S2, No JVD, bradycardic, No edema  Respiratory: Lungs clear to auscultation 	  Psychiatry: A & O x 3, Mood & affect appropriate  Gastrointestinal:  Soft, Non-tender, + BS	  Skin: No rashes, No ecchymoses, No cyanosis	  Neurologic: Non-focal  Extremities: Normal range of motion, No clubbing, cyanosis or edema  Vascular: Peripheral pulses present      LABS:	 	    CBC Full  -  ( 01 Oct 2017 22:51 )  WBC Count : 7.1 K/uL  Hemoglobin : 14.7 g/dL  Hematocrit : 43.6 %  Platelet Count - Automated : 217 K/uL  Mean Cell Volume : 87.4 fl  Mean Cell Hemoglobin : 29.5 pg  Mean Cell Hemoglobin Concentration : 33.8 gm/dL    10-01    140  |  101  |  20  ----------------------------<  111<H>  4.0   |  22  |  0.96  10-01    143  |  102  |  15  ----------------------------<  97  3.8   |  26  |  0.77    Ca    10.1      01 Oct 2017 22:47  Ca    9.8      01 Oct 2017 05:21    TPro  7.4  /  Alb  4.3  /  TBili  0.3  /  DBili  x   /  AST  32  /  ALT  35  /  AlkPhos  64  10-01      CARDIAC MARKERS:  Troponin I, Serum: 0.060 ng/mL (09-29 @ 05:40)  Troponin I, Serum: 0.078 ng/mL (09-29 @ 02:24)      ECG:  	Sinus bradycardia with ST elevations V2-V6 with deep ST depressions in II, III, AVF    PREVIOUS DIAGNOSTIC TESTING:    [ ] Echocardiogram: < from: Transthoracic Echocardiogram (09.30.17 @ 22:08) >  Dimensions:    Normal Values:  LA:     3.5    2.0 - 4.0 cm  Ao:     2.6    2.0 - 3.8 cm  SEPTUM: 0.8    0.6 - 1.2 cm  PWT:    0.9    0.6 - 1.1cm  LVIDd:  4.4    3.0 - 5.6 cm  LVIDs:  2.3    1.8 - 4.0 cm  Derived variables:  LVMI: 67 g/m2  RWT: 0.40  Fractional short: 48 %  EF (Visual Estimate): 65 %    Conclusions:  1. Mild-moderate mitral regurgitation.  2. Normal left ventricular internal dimensions and wall  thicknesses.  3. Normal left ventricular systolic function. No segmental  wall motion abnormalities.  4. Normal diastolic function  5. Normal right ventricular size and function.    [ ]  Catheterization: < from: Cardiac Cath Lab - Adult (09.29.17 @ 16:41) >  LMCA: Diffuse irregularity.There is mild diffuse disease noted.    LAD: Diffuse irregularity.     Prox LAD: Ostial.90% stenosis 12 mm length.Pre procedure RUSLAN III flow   was   noted. The lesion was diagnosed as a moderate risk lesion.     The lesion was previously treated with the following devices: drug   eluting   stent.     Prox LAD: Distal subsection.0% stenosis 20 mm length.Pre procedure RUSLAN   III flow was noted. The lesion was diagnosed as a low risk lesion.    LCx: Diffuse irregularity.     Prox CX: Ostial.60% stenosis 12 mm length.Pre procedure RUSLAN III flow was   noted. Good runoff was present.The lesion was diagnosed as a moderate   risk   lesion.     The lesion was previously treated with the following devices: drug   eluting   stent.     1st Ob Winter: Proximal subsection.100% stenosis 20 mm length.Pre procedure   RUSLAN 0 flowwas noted. The lesion was diagnosed as a high risk lesion.     The lesion was previously treated with the following devices: drug   eluting   stent.    RCA: Diffuse irregularity.There is mild diffuse disease noted.      ASSESSMENT/PLAN: 	  67F w.HTN, HLD, CAD hx s/p 10 PCI and breast Ca in remission presented to [facility] with substernal CP admitted for unstable angina with cardiac cath revealing 2vCAD ostial LAD and Lcx transferred to [facility] for 2vCABG course complicated by acute CP with anterolateral STEMI found to have significant occlusion of pLAD now s/p PCI.     #Chest pain w/significant CAD hx and ECG changes with anterolateral STEMI; [facility] demonstrated pLAD occlusion now s/p PCI--During the procedure she had 2 episodes of Vfib shocked 2x Amio 150mg x1 and Lido 100mg x1;   - Trend trops and EKG  - Brillanta and ASA  - High dose Statin   - Check TTE   - Monitor IABP closely and telemetry   - For BP control-Transiently required levo during the procedure, recently weaned off; now hypertensive; If remains  will start hydralazine PO  - Unable to start BB given sinus bradycardia 40-50s (Noted prior to current event)   - Case d/w Dr Perry and family updated at bedside.

## 2017-10-01 NOTE — PROGRESS NOTE ADULT - SUBJECTIVE AND OBJECTIVE BOX
2210: Called to see patient for acute chest pain  VITAL SIGNS  T(F): 97.8  HR: 44  BP: 169/80  RR: 26   SpO2: 95%    LAB  PTT@2000 44, nomogram followed by RN and heparin gtt increased    DIAGNOSTICS  Transthoracic Echocardiogram   EF (Visual Estimate): 65 %  ------------------------------------------------------------------------  Observations:  Mitral Valve: Normal mitral valve. Mild-moderate mitral  regurgitation.  Aortic Valve/Aorta: Normal trileaflet aortic valve. No  aortic valve regurgitation seen.  Normal aortic root (Ao: 2.6 cm at the sinuses of Valsalva).  Left Atrium: Normal left atrium.  LA volume index = 28  cc/m2.  Left Ventricle: Normal left ventricular systolic function.  No segmental wall motion abnormalities. Normal left  ventricular internal dimensions and wall thicknesses.  Normal diastolic function  Right Heart: Normal right atrium. Normal right ventricular  size and function. Normal tricuspid valve. Mild tricuspid  regurgitation. Pulmonic valve not well visualized.  Pericardium/Pleura: Normal pericardium with no pericardial  effusion.  Hemodynamic: Estimated right atrial pressure is 8 mm Hg.  Estimated right ventricular systolic pressure equals 33 mm  Hg, assuming right atrial pressure equals 8 mm Hg,  consistent with normal pulmonary pressures.  ------------------------------------------------------------------------  Conclusions:  1. Mild-moderate mitral regurgitation.  2. Normal left ventricular internal dimensions and wall  thicknesses.  3. Normal left ventricular systolic function. No segmental  wall motion abnormalities.  4. Normal diastolic function  5. Normal right ventricular size and function.    MEDICATIONS  atorvastatin 80 milliGRAM(s) Oral at bedtime  pantoprazole    Tablet 40 milliGRAM(s) Oral before breakfast  oxybutynin XL 15 milliGRAM(s) Oral at bedtime  amLODIPine   Tablet 5 milliGRAM(s) Oral daily  gabapentin 200 milliGRAM(s) Oral daily  metoprolol 12.5 milliGRAM(s) Oral two times a day  morphine  - Injectable 2 milliGRAM(s) IV Push once  nitroglycerin  Infusion 50 MICROgram(s)/Min IV Continuous <Continuous>  morphine  - Injectable 2 milliGRAM(s) IV Push once      PHYSICAL EXAM  GEN: Distress due to chest pain, 2mg morphine administered stat. Dipho  NEURO: Non-focal,  A+Ox 3  CV: S1 S2 without rub or murmur  MEDIASTINUM: Sternal incision covered with dressing, CDI, stable  PACING WIRES:   VVI [  ]  setting:      Isolated/Insulated [  ]  DRAINS:  Patrick [  ]  Drainage:         Pleural [  ]  Drainage:    PULMONARY:  CTA, Decreased left base   INCENTIVE SPIROMETRY:  ABDOMEN:  Soft, non-tender, non-distended, bowel sounds active x 4  LBM:  : voiding   VASCULAR: +pp +radial  SKIN: Warm, dry, intact   leg incision:  ACE [  ]  MUSCULOSKELETAL:  Moves all extremities equally  PHYSICAL THERAPY REC:  Home [  ]  Home w PT [   ]   EDYTA [   ] 2210: Called to see patient for acute chest pain  VITAL SIGNS  T(F): 97.8  HR: 44  BP: 169/80  RR: 26   SpO2: 95%    LAB  PTT@2000 44, nomogram followed by RN and heparin gtt increased    DIAGNOSTICS  Transthoracic Echocardiogram   EF (Visual Estimate): 65 %  ------------------------------------------------------------------------  Observations:  Mitral Valve: Normal mitral valve. Mild-moderate mitral  regurgitation.  Aortic Valve/Aorta: Normal trileaflet aortic valve. No  aortic valve regurgitation seen.  Normal aortic root (Ao: 2.6 cm at the sinuses of Valsalva).  Left Atrium: Normal left atrium.  LA volume index = 28  cc/m2.  Left Ventricle: Normal left ventricular systolic function.  No segmental wall motion abnormalities. Normal left  ventricular internal dimensions and wall thicknesses.  Normal diastolic function  Right Heart: Normal right atrium. Normal right ventricular  size and function. Normal tricuspid valve. Mild tricuspid  regurgitation. Pulmonic valve not well visualized.  Pericardium/Pleura: Normal pericardium with no pericardial  effusion.  Hemodynamic: Estimated right atrial pressure is 8 mm Hg.  Estimated right ventricular systolic pressure equals 33 mm  Hg, assuming right atrial pressure equals 8 mm Hg,  consistent with normal pulmonary pressures.  ------------------------------------------------------------------------  Conclusions:  1. Mild-moderate mitral regurgitation.  2. Normal left ventricular internal dimensions and wall  thicknesses.  3. Normal left ventricular systolic function. No segmental  wall motion abnormalities.  4. Normal diastolic function  5. Normal right ventricular size and function.    MEDICATIONS  atorvastatin 80 milliGRAM(s) Oral at bedtime  pantoprazole    Tablet 40 milliGRAM(s) Oral before breakfast  oxybutynin XL 15 milliGRAM(s) Oral at bedtime  amLODIPine   Tablet 5 milliGRAM(s) Oral daily  gabapentin 200 milliGRAM(s) Oral daily  metoprolol 12.5 milliGRAM(s) Oral two times a day  morphine  - Injectable 2 milliGRAM(s) IV Push once  nitroglycerin  Infusion 50 MICROgram(s)/Min IV Continuous <Continuous>  morphine  - Injectable 2 milliGRAM(s) IV Push once      PHYSICAL EXAM  GEN: Distress due to chest pain, 2mg morphine administered stat. Diphoretic  NEURO: Non-focal,  A+Ox 3  CV: S1 S2 without rub or murmur  PULMONARY:  CTA, Decreased right base, unable to take deep breath  ABDOMEN:  Soft, non-tender, non-distended, bowel sounds active x 4  VASCULAR: +pp +radial  SKIN: Warm, diaphoretic  MUSCULOSKELETAL:  Moves all extremities equally 2210: Called to see patient for acute chest pain  VITAL SIGNS  T(F): 97.8  HR: 44  BP: 169/80  RR: 26   SpO2: 95%    LAB  PTT@2000 44, nomogram followed by RN and heparin gtt increased    DIAGNOSTICS:  12 Lead:  ST elevation I AVL V2 V5  reciprocal ST depression II III AVF      Transthoracic Echocardiogram   EF (Visual Estimate): 65 %  ------------------------------------------------------------------------  Observations:  Mitral Valve: Normal mitral valve. Mild-moderate mitral  regurgitation.  Aortic Valve/Aorta: Normal trileaflet aortic valve. No  aortic valve regurgitation seen.  Normal aortic root (Ao: 2.6 cm at the sinuses of Valsalva).  Left Atrium: Normal left atrium.  LA volume index = 28  cc/m2.  Left Ventricle: Normal left ventricular systolic function.  No segmental wall motion abnormalities. Normal left  ventricular internal dimensions and wall thicknesses.  Normal diastolic function  Right Heart: Normal right atrium. Normal right ventricular  size and function. Normal tricuspid valve. Mild tricuspid  regurgitation. Pulmonic valve not well visualized.  Pericardium/Pleura: Normal pericardium with no pericardial  effusion.  Hemodynamic: Estimated right atrial pressure is 8 mm Hg.  Estimated right ventricular systolic pressure equals 33 mm  Hg, assuming right atrial pressure equals 8 mm Hg,  consistent with normal pulmonary pressures.  ------------------------------------------------------------------------  Conclusions:  1. Mild-moderate mitral regurgitation.  2. Normal left ventricular internal dimensions and wall  thicknesses.  3. Normal left ventricular systolic function. No segmental  wall motion abnormalities.  4. Normal diastolic function  5. Normal right ventricular size and function.    MEDICATIONS  atorvastatin 80 milliGRAM(s) Oral at bedtime  pantoprazole    Tablet 40 milliGRAM(s) Oral before breakfast  oxybutynin XL 15 milliGRAM(s) Oral at bedtime  amLODIPine   Tablet 5 milliGRAM(s) Oral daily  gabapentin 200 milliGRAM(s) Oral daily  metoprolol 12.5 milliGRAM(s) Oral two times a day  morphine  - Injectable 2 milliGRAM(s) IV Push once  nitroglycerin  Infusion 50 MICROgram(s)/Min IV Continuous <Continuous>  morphine  - Injectable 2 milliGRAM(s) IV Push once      PHYSICAL EXAM  GEN: Distress due to chest pain, 2mg morphine administered stat. Diphoretic  NEURO: Non-focal,  A+Ox 3  CV: S1 S2 without rub or murmur  PULMONARY:  CTA, Decreased right base, unable to take deep breath  ABDOMEN:  Soft, non-tender, non-distended, bowel sounds active x 4  VASCULAR: +pp +radial  SKIN: Warm, diaphoretic  MUSCULOSKELETAL:  Moves all extremities equally

## 2017-10-01 NOTE — PROGRESS NOTE ADULT - ASSESSMENT
68 YO F, former all-state ,  with pmh of CAD s/p ~ 10 coronary stents (2005 and ~ 2011), prior MI, HTN, hyperlipidemia, Breast CA (1993) s/p left mastectomy and chemo, former tobacco use presented to ER via ambulance with c/o chest pain.   Patient prompted spouse to call EMS after experienced moderately severe discomfort in a band-like distribution across her chest with radiation to the right aspect of her jaw -- shared some characteristics of her chronic stable angina but occurred at rest and did not improve with 1 SL NTG which is usual.  Typically her angina is prompted by exertion and relieved quickly with rest.  s ultimately resolved following a 2nd NTG given to her by EMS.    She lives in [place] and has been visiting family in [place] since returning from a  vacation to [place] and [place] 1 week ago.  Her daughter is an anesthesia resident at [facility] [employer].     She underwent cardiac cath at [facility] which revealed 2 Vessel CAD with ostial LAD and LCX disease (instent restenosis) and was transferred to [facility] for further management.   Called by RN for patient complain of acute onset of chest pain, shortly after returning from showering, unrelieved by nitro paste/sublingual nitro, morphine 2mg x 2.  CTU urgently contacted to evaluate patient. EKG with anterolateral STEMI, without relief from previous interventions, tridil gtt initiated, with resulting hypotension and persistent chest pain.  Cardiology fellow contacted for emergent IABP.

## 2017-10-01 NOTE — PROGRESS NOTE ADULT - SUBJECTIVE AND OBJECTIVE BOX
Cardiac Surgery Pre-op Note:  CC: Patient is a 67y old  Female who presents with a chief complaint of cardiac surgery (29 Sep 2017 22:45)      Referring Physician:                                                                                             Surgeon:  Procedure: (Date) (Procedure)    Allergies    No Known Allergies    Intolerances    oxycodone (Pruritus (Mild))    HPI:  66 YO F, former all-state ,  with pmh of CAD s/p ~ 10 coronary stents (2005 and ~ 2011), prior MI, HTN, hyperlipidemia, Breast CA (1993) s/p left mastectomy and chemo, former tobacco use presented to ER via ambulance with c/o chest pain.   Patient prompted spouse to call EMS after experienced moderately severe discomfort in a band-like distribution across her chest with radiation to the right aspect of her jaw -- shared some characteristics of her chronic stable angina but occurred at rest and did not improve with 1 SL NTG which is usual.  Typically her angina is prompted by exertion and relieved quickly with rest.  s ultimately resolved following a 2nd NTG given to her by EMS.    She lives in [place] and has been visiting family in [place] since returning from a  vacation to [place] and [place] 1 week ago.  Her daughter is an anesthesia resident at [facility].     She underwent cardiac cath at [facility] which revealed 2 Vessel CAD with ostial LAD and LCX disease (instent restenosis) and was transferred to [facility] for further management.     Daughter:  Carolina Leisa Cadena [phone] (29 Sep 2017 22:45)      PAST MEDICAL & SURGICAL HISTORY:  S/P chemotherapy, time since greater than 12 weeks  Malignant neoplasm of left female breast, unspecified estrogen receptor status, unspecified site of breast  Arthritis  MI (myocardial infarction)  CAD (coronary artery disease)  H/O mastectomy, left  History of heart artery stent      MEDICATIONS  (STANDING):  aspirin  chewable 81 milliGRAM(s) Oral daily  atorvastatin 80 milliGRAM(s) Oral at bedtime  pantoprazole    Tablet 40 milliGRAM(s) Oral before breakfast  oxybutynin XL 15 milliGRAM(s) Oral at bedtime  influenza   Vaccine 0.5 milliLiter(s) IntraMuscular once  amLODIPine   Tablet 5 milliGRAM(s) Oral daily  gabapentin 200 milliGRAM(s) Oral daily  mupirocin 2% Ointment 1 Application(s) Topical two times a day  heparin  Infusion. 750 Unit(s)/Hr (7.5 mL/Hr) IV Continuous <Continuous>  metoprolol 12.5 milliGRAM(s) Oral two times a day  chlorhexidine 4% Liquid 1 Application(s) Topical once  chlorhexidine 0.12% Liquid 15 milliLiter(s) Swish and Spit once    MEDICATIONS  (PRN):  heparin  Injectable 4100 Unit(s) IV Push every 6 hours PRN For aPTT less than 40  ALPRAZolam 0.25 milliGRAM(s) Oral three times a day PRN anxiety  acetaminophen   Tablet. 650 milliGRAM(s) Oral every 6 hours PRN Mild Pain (1 - 3)  heparin  Injectable 4400 Unit(s) IV Push every 6 hours PRN For aPTT less than 40      Labs:                        14.1   5.0   )-----------( 188      ( 01 Oct 2017 05:21 )             40.0     10-01    143  |  102  |  15  ----------------------------<  97  3.8   |  26  |  0.77    Ca    9.8      01 Oct 2017 05:21      PT/INR - ( 01 Oct 2017 05:21 )   PT: 12.1 sec;   INR: 1.11 ratio         PTT - ( 01 Oct 2017 05:21 )  PTT:83.4 sec    Blood Type: ABO Interpretation: O (09-29 @ 23:20)    HGB A1C: Hemoglobin A1C, Whole Blood: 5.7 % (09-30 @ 05:59)    Prealbumin:   Pro-BNP: Serum Pro-Brain Natriuretic Peptide: 437 pg/mL (09-29 @ 23:59)    TSH; 4.57     MRSA: MRSA PCR Result.: NotDetec (09-30 @ 05:58)  MSSA: MSSA PCR Result.: Detected (09-30 @ 05:58)    Urinalysis Basic - ( 29 Sep 2017 23:13 )    Color: x / Appearance: Clear / SG: >1.030 / pH: x  Gluc: x / Ketone: Negative  / Bili: Negative / Urobili: Negative   Blood: x / Protein: Negative / Nitrite: Negative   Leuk Esterase: Trace / RBC: 0-2 /HPF / WBC 5-10 /HPF   Sq Epi: x / Non Sq Epi: x / Bacteria: x    P2Y12 191    CXR: Small right pleural effusion- mild cardiomegaly    EKG:    Carotid Duplex:      PFT's:    Echocardiogram:    Cardiac catheterization:    Gen: WN/WD NAD  Neuro: AAOx3, nonfocal  Pulm: CTA B/L  CV: RRR, S1S2 +systolic murmur  Abd: Soft, NT, ND +BS  Ext: No edema, + peripheral pulses    Pt has AICD/PPM [ ] Yes  [x ] No        Pre-op Beta Blocker ordered within 24 hrs of surgery (CABG ONLY)?  [x ] Yes  [ ] No  If not, Why?  Type & Cross  [ ] Yes  [ ] No  NPO after Midnight [x ] Yes  [ ] No  Pre-op ABX ordered, to be taped on chart:  [ x] Yes  [ ] No     Hibiclens/Peridex ordered [x ] Yes  [ ] No  Intraop on Hold: PRBCs, CXR, LANDRY [x ]   Consent obtained  [x ] Yes  [ ] No Cardiac Surgery Pre-op Note:  CC: Patient is a 67y old  Female who presents with a chief complaint of cardiac surgery (29 Sep 2017 22:45)      Referring Physician:                                                                                             Surgeon: Sr Libra Elizabeth  Procedure: (Date) (Procedure) CABG 10/2    Allergies: No Known Allergies    Intolerances: oxycodone (Pruritus (Mild))    HPI:  66 YO F, former all-state ,  with pmh of CAD s/p ~ 10 coronary stents (2005 and ~ 2011), prior MI, HTN, hyperlipidemia, Breast CA (1993) s/p left mastectomy and chemo, former tobacco use presented to ER via ambulance with c/o chest pain.   Patient prompted spouse to call EMS after experienced moderately severe discomfort in a band-like distribution across her chest with radiation to the right aspect of her jaw -- shared some characteristics of her chronic stable angina but occurred at rest and did not improve with 1 SL NTG which is usual.  Typically her angina is prompted by exertion and relieved quickly with rest.  s ultimately resolved following a 2nd NTG given to her by EMS.    She lives in [place] and has been visiting family in [place] since returning from a  vacation to [place] and [place] 1 week ago.  Her daughter is an anesthesia resident at [facility].     She underwent cardiac cath at [facility] which revealed 2 Vessel CAD with ostial LAD and LCX disease (instent restenosis) and was transferred to [facility] for further management.     Daughter:  Dr. Leisa Cadena [phone] (29 Sep 2017 22:45)    PAST MEDICAL & SURGICAL HISTORY:  S/P chemotherapy, time since greater than 12 weeks  Malignant neoplasm of left female breast, unspecified estrogen receptor status, unspecified site of breast  Arthritis  MI (myocardial infarction)  CAD (coronary artery disease)  H/O mastectomy, left  History of heart artery stent      MEDICATIONS  (STANDING):  aspirin  chewable 81 milliGRAM(s) Oral daily  atorvastatin 80 milliGRAM(s) Oral at bedtime  pantoprazole    Tablet 40 milliGRAM(s) Oral before breakfast  oxybutynin XL 15 milliGRAM(s) Oral at bedtime  influenza   Vaccine 0.5 milliLiter(s) IntraMuscular once  amLODIPine   Tablet 5 milliGRAM(s) Oral daily  gabapentin 200 milliGRAM(s) Oral daily  mupirocin 2% Ointment 1 Application(s) Topical two times a day  heparin  Infusion. 750 Unit(s)/Hr (7.5 mL/Hr) IV Continuous <Continuous>  metoprolol 12.5 milliGRAM(s) Oral two times a day  chlorhexidine 4% Liquid 1 Application(s) Topical once  chlorhexidine 0.12% Liquid 15 milliLiter(s) Swish and Spit once    MEDICATIONS  (PRN):  heparin  Injectable 4100 Unit(s) IV Push every 6 hours PRN For aPTT less than 40  ALPRAZolam 0.25 milliGRAM(s) Oral three times a day PRN anxiety  acetaminophen   Tablet. 650 milliGRAM(s) Oral every 6 hours PRN Mild Pain (1 - 3)  heparin  Injectable 4400 Unit(s) IV Push every 6 hours PRN For aPTT less than 40      Labs:                        14.1   5.0   )-----------( 188      ( 01 Oct 2017 05:21 )             40.0     10-01    143  |  102  |  15  ----------------------------<  97  3.8   |  26  |  0.77    Ca    9.8      01 Oct 2017 05:21      PT/INR - ( 01 Oct 2017 05:21 )   PT: 12.1 sec;   INR: 1.11 ratio         PTT - ( 01 Oct 2017 05:21 )  PTT:83.4 sec    Blood Type: ABO Interpretation: O (09-29 @ 23:20)    HGB A1C: Hemoglobin A1C, Whole Blood: 5.7 % (09-30 @ 05:59)    Prealbumin:   Pro-BNP: Serum Pro-Brain Natriuretic Peptide: 437 pg/mL (09-29 @ 23:59)    TSH; 4.57     MRSA: MRSA PCR Result.: NotDetec (09-30 @ 05:58)  MSSA: MSSA PCR Result.: Detected (09-30 @ 05:58)    Urinalysis Basic - ( 29 Sep 2017 23:13 )    Color: x / Appearance: Clear / SG: >1.030 / pH: x  Gluc: x / Ketone: Negative  / Bili: Negative / Urobili: Negative   Blood: x / Protein: Negative / Nitrite: Negative   Leuk Esterase: Trace / RBC: 0-2 /HPF / WBC 5-10 /HPF   Sq Epi: x / Non Sq Epi: x / Bacteria: x    P2Y12 191    CXR: Small right pleural effusion- mild cardiomegaly    EKG:    Carotid Duplex:      PFT's:    Echocardiogram:    Cardiac catheterization:    Gen: WN/WD NAD  Neuro: AAOx3, nonfocal  Pulm: CTA B/L  CV: RRR, S1S2 +systolic murmur  Abd: Soft, NT, ND +BS  Ext: No edema, + peripheral pulses    Pt has AICD/PPM [ ] Yes  [x ] No        Pre-op Beta Blocker ordered within 24 hrs of surgery (CABG ONLY)?  [x ] Yes  [ ] No  If not, Why?  Type & Cross  [ ] Yes  [ ] No  NPO after Midnight [x ] Yes  [ ] No  Pre-op ABX ordered, to be taped on chart:  [ x] Yes  [ ] No     Hibiclens/Peridex ordered [x ] Yes  [ ] No  Intraop on Hold: PRBCs, CXR, LANDRY [x ]   Consent obtained  [x ] Yes  [ ] No

## 2017-10-01 NOTE — PROGRESS NOTE ADULT - PROBLEM SELECTOR PLAN 1
cabg in am  npo after midnight  d/c planning cabg in am  npo after midnight  d/c planning  heparin gtt to OR

## 2017-10-02 ENCOUNTER — APPOINTMENT (OUTPATIENT)
Dept: CARDIOTHORACIC SURGERY | Facility: HOSPITAL | Age: 67
End: 2017-10-02

## 2017-10-02 ENCOUNTER — TRANSCRIPTION ENCOUNTER (OUTPATIENT)
Age: 67
End: 2017-10-02

## 2017-10-02 DIAGNOSIS — I21.02 ST ELEVATION (STEMI) MYOCARDIAL INFARCTION INVOLVING LEFT ANTERIOR DESCENDING CORONARY ARTERY: ICD-10-CM

## 2017-10-02 PROBLEM — Z00.00 ENCOUNTER FOR PREVENTIVE HEALTH EXAMINATION: Status: ACTIVE | Noted: 2017-10-02

## 2017-10-02 LAB
ALBUMIN SERPL ELPH-MCNC: 3.7 G/DL — SIGNIFICANT CHANGE UP (ref 3.3–5)
ALBUMIN SERPL ELPH-MCNC: 4.3 G/DL — SIGNIFICANT CHANGE UP (ref 3.3–5)
ALP SERPL-CCNC: 57 U/L — SIGNIFICANT CHANGE UP (ref 40–120)
ALP SERPL-CCNC: 65 U/L — SIGNIFICANT CHANGE UP (ref 40–120)
ALT FLD-CCNC: 119 U/L RC — HIGH (ref 10–45)
ALT FLD-CCNC: 95 U/L RC — HIGH (ref 10–45)
ANION GAP SERPL CALC-SCNC: 16 MMOL/L — SIGNIFICANT CHANGE UP (ref 5–17)
ANION GAP SERPL CALC-SCNC: 17 MMOL/L — SIGNIFICANT CHANGE UP (ref 5–17)
ANION GAP SERPL CALC-SCNC: 18 MMOL/L — HIGH (ref 5–17)
APTT BLD: 36.1 SEC — SIGNIFICANT CHANGE UP (ref 27.5–37.4)
APTT BLD: 62.5 SEC — HIGH (ref 27.5–37.4)
APTT BLD: 67.8 SEC — HIGH (ref 27.5–37.4)
AST SERPL-CCNC: 391 U/L — HIGH (ref 10–40)
AST SERPL-CCNC: 463 U/L — HIGH (ref 10–40)
BASOPHILS # BLD AUTO: 0 K/UL — SIGNIFICANT CHANGE UP (ref 0–0.2)
BASOPHILS # BLD AUTO: 0 K/UL — SIGNIFICANT CHANGE UP (ref 0–0.2)
BASOPHILS NFR BLD AUTO: 0.3 % — SIGNIFICANT CHANGE UP (ref 0–2)
BASOPHILS NFR BLD AUTO: 0.3 % — SIGNIFICANT CHANGE UP (ref 0–2)
BILIRUB SERPL-MCNC: 0.3 MG/DL — SIGNIFICANT CHANGE UP (ref 0.2–1.2)
BILIRUB SERPL-MCNC: 0.7 MG/DL — SIGNIFICANT CHANGE UP (ref 0.2–1.2)
BLD GP AB SCN SERPL QL: NEGATIVE — SIGNIFICANT CHANGE UP
BUN SERPL-MCNC: 25 MG/DL — HIGH (ref 7–23)
BUN SERPL-MCNC: 29 MG/DL — HIGH (ref 7–23)
BUN SERPL-MCNC: 33 MG/DL — HIGH (ref 7–23)
CALCIUM SERPL-MCNC: 9.2 MG/DL — SIGNIFICANT CHANGE UP (ref 8.4–10.5)
CALCIUM SERPL-MCNC: 9.5 MG/DL — SIGNIFICANT CHANGE UP (ref 8.4–10.5)
CALCIUM SERPL-MCNC: 9.6 MG/DL — SIGNIFICANT CHANGE UP (ref 8.4–10.5)
CHLORIDE SERPL-SCNC: 100 MMOL/L — SIGNIFICANT CHANGE UP (ref 96–108)
CHLORIDE SERPL-SCNC: 100 MMOL/L — SIGNIFICANT CHANGE UP (ref 96–108)
CHLORIDE SERPL-SCNC: 102 MMOL/L — SIGNIFICANT CHANGE UP (ref 96–108)
CK MB BLD-MCNC: 7.3 % — HIGH (ref 0–3.5)
CK MB BLD-MCNC: 7.7 % — HIGH (ref 0–3.5)
CK MB BLD-MCNC: 7.9 % — HIGH (ref 0–3.5)
CK MB CFR SERPL CALC: 141 NG/ML — HIGH (ref 0–3.8)
CK MB CFR SERPL CALC: 300 NG/ML — HIGH (ref 0–3.8)
CK MB CFR SERPL CALC: 327 NG/ML — HIGH (ref 0–3.8)
CK SERPL-CCNC: 1789 U/L — HIGH (ref 25–170)
CK SERPL-CCNC: 3914 U/L — HIGH (ref 25–170)
CK SERPL-CCNC: 4471 U/L — HIGH (ref 25–170)
CO2 SERPL-SCNC: 20 MMOL/L — LOW (ref 22–31)
CO2 SERPL-SCNC: 21 MMOL/L — LOW (ref 22–31)
CO2 SERPL-SCNC: 23 MMOL/L — SIGNIFICANT CHANGE UP (ref 22–31)
CREAT SERPL-MCNC: 1.03 MG/DL — SIGNIFICANT CHANGE UP (ref 0.5–1.3)
CREAT SERPL-MCNC: 1.09 MG/DL — SIGNIFICANT CHANGE UP (ref 0.5–1.3)
CREAT SERPL-MCNC: 1.15 MG/DL — SIGNIFICANT CHANGE UP (ref 0.5–1.3)
EOSINOPHIL # BLD AUTO: 0 K/UL — SIGNIFICANT CHANGE UP (ref 0–0.5)
EOSINOPHIL # BLD AUTO: 0 K/UL — SIGNIFICANT CHANGE UP (ref 0–0.5)
EOSINOPHIL NFR BLD AUTO: 0 % — SIGNIFICANT CHANGE UP (ref 0–6)
EOSINOPHIL NFR BLD AUTO: 0.2 % — SIGNIFICANT CHANGE UP (ref 0–6)
GLUCOSE SERPL-MCNC: 136 MG/DL — HIGH (ref 70–99)
GLUCOSE SERPL-MCNC: 151 MG/DL — HIGH (ref 70–99)
GLUCOSE SERPL-MCNC: 163 MG/DL — HIGH (ref 70–99)
HCT VFR BLD CALC: 43.1 % — SIGNIFICANT CHANGE UP (ref 34.5–45)
HCT VFR BLD CALC: 43.8 % — SIGNIFICANT CHANGE UP (ref 34.5–45)
HGB BLD-MCNC: 14.5 G/DL — SIGNIFICANT CHANGE UP (ref 11.5–15.5)
HGB BLD-MCNC: 14.5 G/DL — SIGNIFICANT CHANGE UP (ref 11.5–15.5)
INR BLD: 1.08 RATIO — SIGNIFICANT CHANGE UP (ref 0.88–1.16)
LACTATE SERPL-SCNC: 1.8 MMOL/L — SIGNIFICANT CHANGE UP (ref 0.7–2)
LYMPHOCYTES # BLD AUTO: 0.8 K/UL — LOW (ref 1–3.3)
LYMPHOCYTES # BLD AUTO: 1 K/UL — SIGNIFICANT CHANGE UP (ref 1–3.3)
LYMPHOCYTES # BLD AUTO: 10.3 % — LOW (ref 13–44)
LYMPHOCYTES # BLD AUTO: 7 % — LOW (ref 13–44)
MAGNESIUM SERPL-MCNC: 1.9 MG/DL — SIGNIFICANT CHANGE UP (ref 1.6–2.6)
MAGNESIUM SERPL-MCNC: 2 MG/DL — SIGNIFICANT CHANGE UP (ref 1.6–2.6)
MAGNESIUM SERPL-MCNC: 2 MG/DL — SIGNIFICANT CHANGE UP (ref 1.6–2.6)
MCHC RBC-ENTMCNC: 28.9 PG — SIGNIFICANT CHANGE UP (ref 27–34)
MCHC RBC-ENTMCNC: 29.7 PG — SIGNIFICANT CHANGE UP (ref 27–34)
MCHC RBC-ENTMCNC: 33.1 GM/DL — SIGNIFICANT CHANGE UP (ref 32–36)
MCHC RBC-ENTMCNC: 33.7 GM/DL — SIGNIFICANT CHANGE UP (ref 32–36)
MCV RBC AUTO: 87.5 FL — SIGNIFICANT CHANGE UP (ref 80–100)
MCV RBC AUTO: 88.1 FL — SIGNIFICANT CHANGE UP (ref 80–100)
MONOCYTES # BLD AUTO: 0.8 K/UL — SIGNIFICANT CHANGE UP (ref 0–0.9)
MONOCYTES # BLD AUTO: 1.2 K/UL — HIGH (ref 0–0.9)
MONOCYTES NFR BLD AUTO: 10.5 % — SIGNIFICANT CHANGE UP (ref 2–14)
MONOCYTES NFR BLD AUTO: 8.2 % — SIGNIFICANT CHANGE UP (ref 2–14)
NEUTROPHILS # BLD AUTO: 7.8 K/UL — HIGH (ref 1.8–7.4)
NEUTROPHILS # BLD AUTO: 9.3 K/UL — HIGH (ref 1.8–7.4)
NEUTROPHILS NFR BLD AUTO: 81.1 % — HIGH (ref 43–77)
NEUTROPHILS NFR BLD AUTO: 81.9 % — HIGH (ref 43–77)
PHOSPHATE SERPL-MCNC: 4 MG/DL — SIGNIFICANT CHANGE UP (ref 2.5–4.5)
PHOSPHATE SERPL-MCNC: 4.5 MG/DL — SIGNIFICANT CHANGE UP (ref 2.5–4.5)
PHOSPHATE SERPL-MCNC: 5.3 MG/DL — HIGH (ref 2.5–4.5)
PLATELET # BLD AUTO: 194 K/UL — SIGNIFICANT CHANGE UP (ref 150–400)
PLATELET # BLD AUTO: 196 K/UL — SIGNIFICANT CHANGE UP (ref 150–400)
POTASSIUM SERPL-MCNC: 4.4 MMOL/L — SIGNIFICANT CHANGE UP (ref 3.5–5.3)
POTASSIUM SERPL-MCNC: 4.6 MMOL/L — SIGNIFICANT CHANGE UP (ref 3.5–5.3)
POTASSIUM SERPL-MCNC: 4.6 MMOL/L — SIGNIFICANT CHANGE UP (ref 3.5–5.3)
POTASSIUM SERPL-SCNC: 4.4 MMOL/L — SIGNIFICANT CHANGE UP (ref 3.5–5.3)
POTASSIUM SERPL-SCNC: 4.6 MMOL/L — SIGNIFICANT CHANGE UP (ref 3.5–5.3)
POTASSIUM SERPL-SCNC: 4.6 MMOL/L — SIGNIFICANT CHANGE UP (ref 3.5–5.3)
PROT SERPL-MCNC: 6.8 G/DL — SIGNIFICANT CHANGE UP (ref 6–8.3)
PROT SERPL-MCNC: 7.4 G/DL — SIGNIFICANT CHANGE UP (ref 6–8.3)
PROTHROM AB SERPL-ACNC: 11.7 SEC — SIGNIFICANT CHANGE UP (ref 9.8–12.7)
RBC # BLD: 4.9 M/UL — SIGNIFICANT CHANGE UP (ref 3.8–5.2)
RBC # BLD: 5 M/UL — SIGNIFICANT CHANGE UP (ref 3.8–5.2)
RBC # FLD: 13.6 % — SIGNIFICANT CHANGE UP (ref 10.3–14.5)
RBC # FLD: 13.7 % — SIGNIFICANT CHANGE UP (ref 10.3–14.5)
RH IG SCN BLD-IMP: POSITIVE — SIGNIFICANT CHANGE UP
SODIUM SERPL-SCNC: 138 MMOL/L — SIGNIFICANT CHANGE UP (ref 135–145)
SODIUM SERPL-SCNC: 138 MMOL/L — SIGNIFICANT CHANGE UP (ref 135–145)
SODIUM SERPL-SCNC: 141 MMOL/L — SIGNIFICANT CHANGE UP (ref 135–145)
TROPONIN T SERPL-MCNC: 11.14 NG/ML — HIGH (ref 0–0.06)
TROPONIN T SERPL-MCNC: 12.52 NG/ML — HIGH (ref 0–0.06)
TROPONIN T SERPL-MCNC: 6.43 NG/ML — HIGH (ref 0–0.06)
WBC # BLD: 11.3 K/UL — HIGH (ref 3.8–10.5)
WBC # BLD: 9.6 K/UL — SIGNIFICANT CHANGE UP (ref 3.8–10.5)
WBC # FLD AUTO: 11.3 K/UL — HIGH (ref 3.8–10.5)
WBC # FLD AUTO: 9.6 K/UL — SIGNIFICANT CHANGE UP (ref 3.8–10.5)

## 2017-10-02 PROCEDURE — 99233 SBSQ HOSP IP/OBS HIGH 50: CPT | Mod: GC

## 2017-10-02 PROCEDURE — 93306 TTE W/DOPPLER COMPLETE: CPT | Mod: 26

## 2017-10-02 PROCEDURE — 71010: CPT | Mod: 26

## 2017-10-02 PROCEDURE — 93010 ELECTROCARDIOGRAM REPORT: CPT | Mod: 77

## 2017-10-02 PROCEDURE — 93010 ELECTROCARDIOGRAM REPORT: CPT | Mod: 76

## 2017-10-02 RX ORDER — CAPTOPRIL 12.5 MG/1
6.25 TABLET ORAL ONCE
Qty: 0 | Refills: 0 | Status: COMPLETED | OUTPATIENT
Start: 2017-10-02 | End: 2017-10-02

## 2017-10-02 RX ORDER — TICAGRELOR 90 MG/1
90 TABLET ORAL
Qty: 0 | Refills: 0 | Status: DISCONTINUED | OUTPATIENT
Start: 2017-10-02 | End: 2017-10-07

## 2017-10-02 RX ORDER — MORPHINE SULFATE 50 MG/1
1 CAPSULE, EXTENDED RELEASE ORAL ONCE
Qty: 0 | Refills: 0 | Status: DISCONTINUED | OUTPATIENT
Start: 2017-10-02 | End: 2017-10-02

## 2017-10-02 RX ORDER — MAGNESIUM SULFATE 500 MG/ML
1 VIAL (ML) INJECTION ONCE
Qty: 0 | Refills: 0 | Status: COMPLETED | OUTPATIENT
Start: 2017-10-02 | End: 2017-10-02

## 2017-10-02 RX ORDER — ESMOLOL HCL 100MG/10ML
25 VIAL (ML) INTRAVENOUS
Qty: 2500 | Refills: 0 | Status: DISCONTINUED | OUTPATIENT
Start: 2017-10-02 | End: 2017-10-03

## 2017-10-02 RX ORDER — HYDRALAZINE HCL 50 MG
5 TABLET ORAL ONCE
Qty: 0 | Refills: 0 | Status: COMPLETED | OUTPATIENT
Start: 2017-10-02 | End: 2017-10-02

## 2017-10-02 RX ORDER — ONDANSETRON 8 MG/1
4 TABLET, FILM COATED ORAL ONCE
Qty: 0 | Refills: 0 | Status: COMPLETED | OUTPATIENT
Start: 2017-10-02 | End: 2017-10-02

## 2017-10-02 RX ORDER — CARVEDILOL PHOSPHATE 80 MG/1
3.12 CAPSULE, EXTENDED RELEASE ORAL EVERY 12 HOURS
Qty: 0 | Refills: 0 | Status: DISCONTINUED | OUTPATIENT
Start: 2017-10-02 | End: 2017-10-02

## 2017-10-02 RX ORDER — ONDANSETRON 8 MG/1
8 TABLET, FILM COATED ORAL ONCE
Qty: 0 | Refills: 0 | Status: COMPLETED | OUTPATIENT
Start: 2017-10-02 | End: 2017-10-02

## 2017-10-02 RX ORDER — HEPARIN SODIUM 5000 [USP'U]/ML
4000 INJECTION INTRAVENOUS; SUBCUTANEOUS EVERY 6 HOURS
Qty: 0 | Refills: 0 | Status: DISCONTINUED | OUTPATIENT
Start: 2017-10-02 | End: 2017-10-03

## 2017-10-02 RX ORDER — ONDANSETRON 8 MG/1
8 TABLET, FILM COATED ORAL ONCE
Qty: 0 | Refills: 0 | Status: DISCONTINUED | OUTPATIENT
Start: 2017-10-02 | End: 2017-10-02

## 2017-10-02 RX ORDER — CARVEDILOL PHOSPHATE 80 MG/1
3.12 CAPSULE, EXTENDED RELEASE ORAL ONCE
Qty: 0 | Refills: 0 | Status: COMPLETED | OUTPATIENT
Start: 2017-10-02 | End: 2017-10-02

## 2017-10-02 RX ORDER — ACETAMINOPHEN 500 MG
1000 TABLET ORAL ONCE
Qty: 0 | Refills: 0 | Status: COMPLETED | OUTPATIENT
Start: 2017-10-02 | End: 2017-10-02

## 2017-10-02 RX ORDER — FENTANYL CITRATE 50 UG/ML
25 INJECTION INTRAVENOUS ONCE
Qty: 0 | Refills: 0 | Status: DISCONTINUED | OUTPATIENT
Start: 2017-10-02 | End: 2017-10-02

## 2017-10-02 RX ORDER — CARVEDILOL PHOSPHATE 80 MG/1
6.25 CAPSULE, EXTENDED RELEASE ORAL EVERY 12 HOURS
Qty: 0 | Refills: 0 | Status: DISCONTINUED | OUTPATIENT
Start: 2017-10-03 | End: 2017-10-04

## 2017-10-02 RX ORDER — HEPARIN SODIUM 5000 [USP'U]/ML
INJECTION INTRAVENOUS; SUBCUTANEOUS
Qty: 25000 | Refills: 0 | Status: DISCONTINUED | OUTPATIENT
Start: 2017-10-02 | End: 2017-10-03

## 2017-10-02 RX ORDER — MORPHINE SULFATE 50 MG/1
2 CAPSULE, EXTENDED RELEASE ORAL ONCE
Qty: 0 | Refills: 0 | Status: DISCONTINUED | OUTPATIENT
Start: 2017-10-02 | End: 2017-10-02

## 2017-10-02 RX ORDER — LISINOPRIL 2.5 MG/1
5 TABLET ORAL DAILY
Qty: 0 | Refills: 0 | Status: DISCONTINUED | OUTPATIENT
Start: 2017-10-02 | End: 2017-10-02

## 2017-10-02 RX ORDER — MORPHINE SULFATE 50 MG/1
2 CAPSULE, EXTENDED RELEASE ORAL ONCE
Qty: 0 | Refills: 0 | Status: DISCONTINUED | OUTPATIENT
Start: 2017-10-02 | End: 2017-10-01

## 2017-10-02 RX ORDER — METOPROLOL TARTRATE 50 MG
12.5 TABLET ORAL ONCE
Qty: 0 | Refills: 0 | Status: COMPLETED | OUTPATIENT
Start: 2017-10-02 | End: 2017-10-02

## 2017-10-02 RX ORDER — CAPTOPRIL 12.5 MG/1
12.5 TABLET ORAL THREE TIMES A DAY
Qty: 0 | Refills: 0 | Status: DISCONTINUED | OUTPATIENT
Start: 2017-10-02 | End: 2017-10-02

## 2017-10-02 RX ORDER — LISINOPRIL 2.5 MG/1
2.5 TABLET ORAL DAILY
Qty: 0 | Refills: 0 | Status: DISCONTINUED | OUTPATIENT
Start: 2017-10-02 | End: 2017-10-02

## 2017-10-02 RX ORDER — HYDRALAZINE HCL 50 MG
25 TABLET ORAL EVERY 8 HOURS
Qty: 0 | Refills: 0 | Status: DISCONTINUED | OUTPATIENT
Start: 2017-10-02 | End: 2017-10-02

## 2017-10-02 RX ORDER — CAPTOPRIL 12.5 MG/1
25 TABLET ORAL EVERY 8 HOURS
Qty: 0 | Refills: 0 | Status: DISCONTINUED | OUTPATIENT
Start: 2017-10-02 | End: 2017-10-03

## 2017-10-02 RX ADMIN — LISINOPRIL 2.5 MILLIGRAM(S): 2.5 TABLET ORAL at 06:56

## 2017-10-02 RX ADMIN — TICAGRELOR 90 MILLIGRAM(S): 90 TABLET ORAL at 17:11

## 2017-10-02 RX ADMIN — Medication 15 MILLIGRAM(S): at 21:42

## 2017-10-02 RX ADMIN — Medication 1000 MILLIGRAM(S): at 14:59

## 2017-10-02 RX ADMIN — HEPARIN SODIUM 900 UNIT(S)/HR: 5000 INJECTION INTRAVENOUS; SUBCUTANEOUS at 14:17

## 2017-10-02 RX ADMIN — CAPTOPRIL 6.25 MILLIGRAM(S): 12.5 TABLET ORAL at 10:04

## 2017-10-02 RX ADMIN — CARVEDILOL PHOSPHATE 3.12 MILLIGRAM(S): 80 CAPSULE, EXTENDED RELEASE ORAL at 16:59

## 2017-10-02 RX ADMIN — HEPARIN SODIUM 900 UNIT(S)/HR: 5000 INJECTION INTRAVENOUS; SUBCUTANEOUS at 21:41

## 2017-10-02 RX ADMIN — CARVEDILOL PHOSPHATE 3.12 MILLIGRAM(S): 80 CAPSULE, EXTENDED RELEASE ORAL at 18:02

## 2017-10-02 RX ADMIN — FENTANYL CITRATE 25 MICROGRAM(S): 50 INJECTION INTRAVENOUS at 04:15

## 2017-10-02 RX ADMIN — ONDANSETRON 4 MILLIGRAM(S): 8 TABLET, FILM COATED ORAL at 14:48

## 2017-10-02 RX ADMIN — CAPTOPRIL 25 MILLIGRAM(S): 12.5 TABLET ORAL at 21:42

## 2017-10-02 RX ADMIN — MORPHINE SULFATE 2 MILLIGRAM(S): 50 CAPSULE, EXTENDED RELEASE ORAL at 10:30

## 2017-10-02 RX ADMIN — Medication 400 MILLIGRAM(S): at 14:42

## 2017-10-02 RX ADMIN — ATORVASTATIN CALCIUM 80 MILLIGRAM(S): 80 TABLET, FILM COATED ORAL at 21:42

## 2017-10-02 RX ADMIN — CAPTOPRIL 12.5 MILLIGRAM(S): 12.5 TABLET ORAL at 14:47

## 2017-10-02 RX ADMIN — MUPIROCIN 1 APPLICATION(S): 20 OINTMENT TOPICAL at 17:00

## 2017-10-02 RX ADMIN — GABAPENTIN 200 MILLIGRAM(S): 400 CAPSULE ORAL at 11:09

## 2017-10-02 RX ADMIN — Medication 0.25 MILLIGRAM(S): at 19:08

## 2017-10-02 RX ADMIN — Medication 650 MILLIGRAM(S): at 22:41

## 2017-10-02 RX ADMIN — Medication 25 MILLIGRAM(S): at 05:10

## 2017-10-02 RX ADMIN — Medication 12.5 MILLIGRAM(S): at 10:00

## 2017-10-02 RX ADMIN — Medication 10.71 MICROGRAM(S)/KG/MIN: at 11:08

## 2017-10-02 RX ADMIN — Medication 650 MILLIGRAM(S): at 21:48

## 2017-10-02 RX ADMIN — Medication 100 GRAM(S): at 21:50

## 2017-10-02 RX ADMIN — TICAGRELOR 90 MILLIGRAM(S): 90 TABLET ORAL at 05:10

## 2017-10-02 RX ADMIN — MUPIROCIN 1 APPLICATION(S): 20 OINTMENT TOPICAL at 05:10

## 2017-10-02 RX ADMIN — PANTOPRAZOLE SODIUM 40 MILLIGRAM(S): 20 TABLET, DELAYED RELEASE ORAL at 05:10

## 2017-10-02 RX ADMIN — MORPHINE SULFATE 2 MILLIGRAM(S): 50 CAPSULE, EXTENDED RELEASE ORAL at 10:00

## 2017-10-02 RX ADMIN — SODIUM CHLORIDE 3 MILLILITER(S): 9 INJECTION INTRAMUSCULAR; INTRAVENOUS; SUBCUTANEOUS at 14:00

## 2017-10-02 RX ADMIN — ONDANSETRON 4 MILLIGRAM(S): 8 TABLET, FILM COATED ORAL at 02:44

## 2017-10-02 RX ADMIN — CAPTOPRIL 6.25 MILLIGRAM(S): 12.5 TABLET ORAL at 14:02

## 2017-10-02 RX ADMIN — SODIUM CHLORIDE 3 MILLILITER(S): 9 INJECTION INTRAMUSCULAR; INTRAVENOUS; SUBCUTANEOUS at 05:07

## 2017-10-02 RX ADMIN — FENTANYL CITRATE 25 MICROGRAM(S): 50 INJECTION INTRAVENOUS at 04:35

## 2017-10-02 RX ADMIN — Medication 81 MILLIGRAM(S): at 11:09

## 2017-10-02 RX ADMIN — Medication 5 MILLIGRAM(S): at 01:22

## 2017-10-02 RX ADMIN — ONDANSETRON 8 MILLIGRAM(S): 8 TABLET, FILM COATED ORAL at 11:21

## 2017-10-02 RX ADMIN — HEPARIN SODIUM 900 UNIT(S)/HR: 5000 INJECTION INTRAVENOUS; SUBCUTANEOUS at 06:55

## 2017-10-02 RX ADMIN — Medication 5 MILLIGRAM(S): at 02:49

## 2017-10-02 RX ADMIN — SODIUM CHLORIDE 3 MILLILITER(S): 9 INJECTION INTRAMUSCULAR; INTRAVENOUS; SUBCUTANEOUS at 21:21

## 2017-10-02 NOTE — CHART NOTE - NSCHARTNOTEFT_GEN_A_CORE
====================  HOSPITAL COURSE  ====================  68 YO F, former all-state ,  with pmh of CAD s/p ~ 10 coronary stents (2005 and ~ 2011), prior MI, HTN, hyperlipidemia, Breast CA (1993) s/p left mastectomy and chemo, former tobacco use presented to ER via ambulance with c/o chest pain.   Patient prompted spouse to call EMS after experienced moderately severe discomfort in a band-like distribution across her chest with radiation to the right aspect of her jaw -- shared some characteristics of her chronic stable angina but occurred at rest and did not improve with 1 SL NTG which is usual.  Typically her angina is prompted by exertion and relieved quickly with rest.  s ultimately resolved following a 2nd NTG given to her by EMS.    She lives in [place] and has been visiting family in [place] since returning from a  vacation to [place] and [place] 1 week ago.  Her daughter is an anesthesia resident at [facility].     She underwent cardiac cath at [facility] which revealed 2 Vessel CAD with ostial LAD and LCX disease (instent restenosis) and was transferred to [facility] for further management.     Patient was accepted by CTS awaiting CABG scheduled for tomorrow AM when patient c/o sudden 7/10 CP a/w CANDE. Cardio called for IABP placement in cath lab. in cath lab, pLAD was found to be 100% occluded and needed a 1 ESTELLE. PCI was c/b 2 episodes of Vfib s/p defib x2 lido 100, amio 150. Patient transferred to CCU for further management.     ====================  VITALS:  ====================    ICU Vital Signs Last 24 Hrs  T(C): 36.5 (02 Oct 2017 00:26), Max: 36.8 (01 Oct 2017 14:09)  T(F): 97.7 (02 Oct 2017 00:26), Max: 98.2 (01 Oct 2017 14:09)  HR: 56 (02 Oct 2017 00:45) (48 - 62)  BP: 174/92 (02 Oct 2017 00:45) (90/57 - 189/93)  BP(mean): 130 (02 Oct 2017 00:45) (70 - 131)  ABP: --  ABP(mean): --  RR: 21 (02 Oct 2017 00:45) (18 - 24)  SpO2: 96% (02 Oct 2017 00:45) (94% - 100%)      I&O's Summary    30 Sep 2017 07:01  -  01 Oct 2017 07:00  --------------------------------------------------------  IN: 1119.5 mL / OUT: 2 mL / NET: 1117.5 mL    01 Oct 2017 07:01  -  02 Oct 2017 01:11  --------------------------------------------------------  IN: 1020 mL / OUT: 600 mL / NET: 420 mL      ====================  LABS:  ====================                          14.7   7.1   )-----------( 217      ( 01 Oct 2017 22:51 )             43.6     10-01    140  |  101  |  20  ----------------------------<  111<H>  4.0   |  22  |  0.96    Ca    10.1      01 Oct 2017 22:47    TPro  7.4  /  Alb  4.3  /  TBili  0.3  /  DBili  x   /  AST  32  /  ALT  35  /  AlkPhos  64  10-01    PT/INR - ( 01 Oct 2017 22:47 )   PT: 11.0 sec;   INR: 1.02 ratio         PTT - ( 01 Oct 2017 22:47 )  PTT:34.4 sec  Creatine Kinase, Serum: 38 U/L (10-01-17 @ 22:47)  Troponin T, Serum: <0.01 ng/mL (10-01-17 @ 22:47)        ====================  PLAN:  ====================  - Trend Arian, EKGs  - DAPT, Statin. BB on hold 2/2 bradycardia in 50s  - HTN, augmenting in 180s. s/p 1 IVP of 5mg hydralazine now, will start PO in morning  - No plan for CABG as culprit lesion has been revascularized ====================  HOSPITAL COURSE  ====================  68 YO F, former all-state ,  with pmh of CAD s/p ~ 10 coronary stents (2005 and ~ 2011), prior MI, HTN, hyperlipidemia, Breast CA (1993) s/p left mastectomy and chemo, former tobacco use presented to ER via ambulance with c/o chest pain.   Patient prompted spouse to call EMS after experienced moderately severe discomfort in a band-like distribution across her chest with radiation to the right aspect of her jaw -- shared some characteristics of her chronic stable angina but occurred at rest and did not improve with 1 SL NTG which is usual.  Typically her angina is prompted by exertion and relieved quickly with rest.  s ultimately resolved following a 2nd NTG given to her by EMS.    She lives in [place] and has been visiting family in [place] since returning from a  vacation to [place] and [place] 1 week ago.  Her daughter is an anesthesia resident at [facility].     She underwent cardiac cath at [facility] which revealed 2 Vessel CAD with ostial LAD and LCX disease (instent restenosis) and was transferred to [facility] for further management.     Patient was accepted by CTS awaiting CABG scheduled for tomorrow AM when patient c/o sudden 7/10 CP a/w CANDE. Cardio called for IABP placement in cath lab. in cath lab, pLAD was found to be 100% occluded and needed a 1 ESTELLE. PCI was c/b 2 episodes of Vfib s/p defib x2 lido 100, amio 150. Patient transferred to CCU for further management.     ====================  VITALS:  ====================    ICU Vital Signs Last 24 Hrs  T(C): 36.5 (02 Oct 2017 00:26), Max: 36.8 (01 Oct 2017 14:09)  T(F): 97.7 (02 Oct 2017 00:26), Max: 98.2 (01 Oct 2017 14:09)  HR: 56 (02 Oct 2017 00:45) (48 - 62)  BP: 174/92 (02 Oct 2017 00:45) (90/57 - 189/93)  BP(mean): 130 (02 Oct 2017 00:45) (70 - 131)  ABP: --  ABP(mean): --  RR: 21 (02 Oct 2017 00:45) (18 - 24)  SpO2: 96% (02 Oct 2017 00:45) (94% - 100%)      I&O's Summary    30 Sep 2017 07:01  -  01 Oct 2017 07:00  --------------------------------------------------------  IN: 1119.5 mL / OUT: 2 mL / NET: 1117.5 mL    01 Oct 2017 07:01  -  02 Oct 2017 01:11  --------------------------------------------------------  IN: 1020 mL / OUT: 600 mL / NET: 420 mL      ====================  LABS:  ====================                          14.7   7.1   )-----------( 217      ( 01 Oct 2017 22:51 )             43.6     10-01    140  |  101  |  20  ----------------------------<  111<H>  4.0   |  22  |  0.96    Ca    10.1      01 Oct 2017 22:47    TPro  7.4  /  Alb  4.3  /  TBili  0.3  /  DBili  x   /  AST  32  /  ALT  35  /  AlkPhos  64  10-01    PT/INR - ( 01 Oct 2017 22:47 )   PT: 11.0 sec;   INR: 1.02 ratio         PTT - ( 01 Oct 2017 22:47 )  PTT:34.4 sec  Creatine Kinase, Serum: 38 U/L (10-01-17 @ 22:47)  Troponin T, Serum: <0.01 ng/mL (10-01-17 @ 22:47)        ====================  PLAN:  ====================  - Trend Arian, EKGs  - DAPT, Statin. BB on hold 2/2 bradycardia in 50s  - HTN, augmenting in 180s. s/p 1 IVP of 5mg hydralazine now, will start PO in morning  - No plan for CABG as culprit lesion has been revascularized    Fellow Addendum:   67F w/HTN, HLD, CAD hx s/p 10 PCI and breast Ca in remission presented to [facility] with substernal CP admitted for unstable angina with cardiac cath revealing 2vCAD ostial LAD and Lcx transferred to [facility] for 2vCABG course complicated by acute CP with anterolateral STEMI found to have significant occlusion of pLAD now s/p PCI.  - BP control; hydralazine; No BB given significant bradycardia   - TTE in am   - IABP and resume heparin once sheath removed.

## 2017-10-02 NOTE — CHART NOTE - NSCHARTNOTEFT_GEN_A_CORE
====================  NEW EVENTS:  ====================  Captopril increased to 25 TID    ====================  SUMMARY:  ====================  67F HTN, HLD, CAD hx s/p 10 PCI and breast Ca in remission presented to [facility] with substernal CP admitted for unstable angina with cardiac cath revealing 2vCAD ostial LAD and Lcx transferred to [facility] for 2vCABG course complicated by acute CP with anterolateral STEMI found to have significant occlusion of pLAD now s/p PCI.     ====================  VITALS:  ====================    ICU Vital Signs Last 24 Hrs  T(C): 37.9 (02 Oct 2017 21:00), Max: 37.9 (02 Oct 2017 21:00)  T(F): 100.3 (02 Oct 2017 21:00), Max: 100.3 (02 Oct 2017 21:00)  HR: 70 (02 Oct 2017 22:00) (48 - 80)  BP: 160/91 (02 Oct 2017 02:00) (90/57 - 189/93)  BP(mean): 123 (02 Oct 2017 02:00) (70 - 135)  ABP: --  ABP(mean): --  RR: 16 (02 Oct 2017 22:00) (13 - 28)  SpO2: 92% (02 Oct 2017 22:00) (91% - 100%)      I&O's Summary    01 Oct 2017 07:01  -  02 Oct 2017 07:00  --------------------------------------------------------  IN: 1149 mL / OUT: 600 mL / NET: 549 mL    02 Oct 2017 07:01  -  02 Oct 2017 22:43  --------------------------------------------------------  IN: 801.7 mL / OUT: 250 mL / NET: 551.7 mL        ====================  LABS:  ====================                          14.5   11.3  )-----------( 194      ( 02 Oct 2017 12:55 )             43.1     10-02    138  |  100  |  33<H>  ----------------------------<  136<H>  4.6   |  21<L>  |  1.15    Ca    9.2      02 Oct 2017 21:04  Phos  4.0     10-02  Mg     1.9     10-02    TPro  6.8  /  Alb  3.7  /  TBili  0.7  /  DBili  x   /  AST  391<H>  /  ALT  119<H>  /  AlkPhos  57  10-02    PT/INR - ( 02 Oct 2017 04:05 )   PT: 11.7 sec;   INR: 1.08 ratio         PTT - ( 02 Oct 2017 21:04 )  PTT:62.5 sec  Creatine Kinase, Serum: 1789 U/L <H> (10-02-17 @ 21:04)  Troponin T, Serum: 11.14 ng/mL <H> (10-02-17 @ 21:04)  Creatine Kinase, Serum: 3914 U/L <H> (10-02-17 @ 12:23)  Troponin T, Serum: 12.52 ng/mL <H> (10-02-17 @ 12:23)  Creatine Kinase, Serum: 4471 U/L <H> (10-02-17 @ 04:05)  Troponin T, Serum: 6.43 ng/mL <H> (10-02-17 @ 04:05)  Creatine Kinase, Serum: 38 U/L (10-01-17 @ 22:47)  Troponin T, Serum: <0.01 ng/mL (10-01-17 @ 22:47)        ====================  PLAN:  ====================  - uptitrate Oral BP meds with goal of titrating off esmolol gtt  - holding hep gtt in AM for IABP removal  - c/w DAPT, Statin, ACEi. Will add BB when tolerated

## 2017-10-02 NOTE — PROGRESS NOTE ADULT - ATTENDING COMMENTS
Patient is seen and examined with fellow, NP and the CCU house-staff. I agree with the history, physical and the assessment and plan.  IWSTEMI s/p PCI to LAD with IABP support   trend CE  check lactate  monitor ECGs  c/w DAPT

## 2017-10-02 NOTE — PROGRESS NOTE ADULT - SUBJECTIVE AND OBJECTIVE BOX
Patient is a 67y old  Female who presents with a chief complaint of cardiac surgery (29 Sep 2017 22:45)     67F w/HTN, HLD, CAD hx s/p 10 PCI and breast Ca in remission presented to [facility] with substernal CP admitted for unstable angina with cardiac cath revealing 2vCAD ostial LAD and Lcx transferred to [facility] for 2vCABG course complicated by acute CP with anterolateral STEMI found to have significant occlusion of pLAD now s/p PCI.    INTERVAL HPI/OVERNIGHT EVENTS:    MEDICATIONS  (STANDING):  sodium chloride 0.9% lock flush 3 milliLiter(s) IV Push every 8 hours  aspirin  chewable 81 milliGRAM(s) Oral daily  atorvastatin 80 milliGRAM(s) Oral at bedtime  pantoprazole    Tablet 40 milliGRAM(s) Oral before breakfast  oxybutynin XL 15 milliGRAM(s) Oral at bedtime  influenza   Vaccine 0.5 milliLiter(s) IntraMuscular once  gabapentin 200 milliGRAM(s) Oral daily  mupirocin 2% Ointment 1 Application(s) Topical two times a day  ticagrelor 90 milliGRAM(s) Oral two times a day  heparin  Infusion.  Unit(s)/Hr (9 mL/Hr) IV Continuous <Continuous>  hydrALAZINE 25 milliGRAM(s) Oral every 8 hours  lisinopril 2.5 milliGRAM(s) Oral daily    MEDICATIONS  (PRN):  ALPRAZolam 0.25 milliGRAM(s) Oral three times a day PRN anxiety  acetaminophen   Tablet. 650 milliGRAM(s) Oral every 6 hours PRN Mild Pain (1 - 3)  heparin  Injectable 4000 Unit(s) IV Push every 6 hours PRN For aPTT less than 40      Allergies  No Known Allergies  Intolerances  oxycodone (Pruritus (Mild))      REVIEW OF SYSTEMS:  CONSTITUTIONAL: No fever, weight loss, or fatigue  ENMT:  No sinus/throat pain  RESPIRATORY: No cough, wheezing, chills or hemoptysis; No shortness of breath  CARDIOVASCULAR: No chest pain, palpitations, dizziness, or leg swelling  GASTROINTESTINAL: No abdominal or epigastric pain. No nausea, vomiting, or hematemesis; No diarrhea or constipation. No melena or hematochezia.  GENITOURINARY: No dysuria, frequency, hematuria, or incontinence  NEUROLOGICAL: No headaches, loss of strength, numbness, or tremors  SKIN: No itching, burning, rashes, or lesions   MUSCULOSKELETAL: No joint pain or swelling;   PSYCHIATRIC: Denies depression, anxiety    Vital Signs Last 24 Hrs  T(C): 36.8 (02 Oct 2017 05:00), Max: 36.8 (01 Oct 2017 14:09)  T(F): 98.3 (02 Oct 2017 05:00), Max: 98.3 (02 Oct 2017 05:00)  HR: 68 (02 Oct 2017 07:00) (48 - 72)  BP: 160/91 (02 Oct 2017 02:00) (90/57 - 189/93)  BP(mean): 123 (02 Oct 2017 02:00) (70 - 135)  RR: 19 (02 Oct 2017 07:00) (13 - 26)  SpO2: 94% (02 Oct 2017 07:00) (93% - 100%)    PHYSICAL EXAM:  GENERAL: NAD, well-groomed, well-developed  HEAD:  Atraumatic, Normocephalic  EYES: PERRLA  ENMT: MMM  NECK: Supple, No JVD  NERVOUS SYSTEM: AOX3, no focal neuro defect  CHEST/LUNG: CTAB, no wheeze  HEART: RRR, no m/r/g  ABDOMEN: Soft, Nontender, Nondistended; Bowel sounds present  EXTREMITIES:  2+ Periph eral Pulses, No clubbing, cyanosis  SKIN: No rashes or lesions    LABS:                        14.5   9.6   )-----------( 196      ( 02 Oct 2017 04:05 )             43.8     02 Oct 2017 04:05    141    |  102    |  25     ----------------------------<  151    4.4     |  23     |  1.09     Ca    9.6        02 Oct 2017 04:05  Phos  4.5       02 Oct 2017 04:05  Mg     2.0       02 Oct 2017 04:05    TPro  7.4    /  Alb  4.3    /  TBili  0.3    /  DBili  x      /  AST  463    /  ALT  95     /  AlkPhos  65     02 Oct 2017 04:05    PT/INR - ( 02 Oct 2017 04:05 )   PT: 11.7 sec;   INR: 1.08 ratio         PTT - ( 02 Oct 2017 04:05 )  PTT:36.1 sec  CAPILLARY BLOOD GLUCOSE Patient is a 67y old  Female who presents with a chief complaint of cardiac surgery (29 Sep 2017 22:45)     67F w/HTN, HLD, CAD hx s/p 10 PCI and breast Ca in remission presented to [facility] with substernal CP admitted for unstable angina with cardiac cath revealing 2vCAD ostial LAD and Lcx transferred to [facility] for 2vCABG course complicated by acute CP with anterolateral STEMI found to have significant occlusion of pLAD now s/p PCI.    INTERVAL HPI/OVERNIGHT EVENTS:  Still w/ 5/10 chest pain, improved from admission.   Hypertensive to systolic 170s.      MEDICATIONS  (STANDING):  sodium chloride 0.9% lock flush 3 milliLiter(s) IV Push every 8 hours  aspirin  chewable 81 milliGRAM(s) Oral daily  atorvastatin 80 milliGRAM(s) Oral at bedtime  pantoprazole    Tablet 40 milliGRAM(s) Oral before breakfast  oxybutynin XL 15 milliGRAM(s) Oral at bedtime  influenza   Vaccine 0.5 milliLiter(s) IntraMuscular once  gabapentin 200 milliGRAM(s) Oral daily  mupirocin 2% Ointment 1 Application(s) Topical two times a day  ticagrelor 90 milliGRAM(s) Oral two times a day  heparin  Infusion.  Unit(s)/Hr (9 mL/Hr) IV Continuous <Continuous>  hydrALAZINE 25 milliGRAM(s) Oral every 8 hours  lisinopril 2.5 milliGRAM(s) Oral daily    MEDICATIONS  (PRN):  ALPRAZolam 0.25 milliGRAM(s) Oral three times a day PRN anxiety  acetaminophen   Tablet. 650 milliGRAM(s) Oral every 6 hours PRN Mild Pain (1 - 3)  heparin  Injectable 4000 Unit(s) IV Push every 6 hours PRN For aPTT less than 40      Allergies  No Known Allergies  Intolerances  oxycodone (Pruritus (Mild))      REVIEW OF SYSTEMS:  CONSTITUTIONAL: No fever, weight loss, or fatigue  ENMT:  No sinus/throat pain  RESPIRATORY: No shortness of breath  CARDIOVASCULAR: +chest pain.  No palpitations.  GASTROINTESTINAL: No abdominal or epigastric pain. No nausea, vomiting, or hematemesis; No diarrhea or constipation. No melena or hematochezia.  GENITOURINARY: No dysuria, frequency, hematuria, or incontinence  NEUROLOGICAL: No headaches, loss of strength, numbness, or tremors  SKIN: No itching, burning, rashes, or lesions   MUSCULOSKELETAL: No joint pain or swelling;   PSYCHIATRIC: Denies depression, anxiety    Vital Signs Last 24 Hrs  T(C): 36.8 (02 Oct 2017 05:00), Max: 36.8 (01 Oct 2017 14:09)  T(F): 98.3 (02 Oct 2017 05:00), Max: 98.3 (02 Oct 2017 05:00)  HR: 68 (02 Oct 2017 07:00) (48 - 72)  BP: 160/91 (02 Oct 2017 02:00) (90/57 - 189/93)  BP(mean): 123 (02 Oct 2017 02:00) (70 - 135)  RR: 19 (02 Oct 2017 07:00) (13 - 26)  SpO2: 94% (02 Oct 2017 07:00) (93% - 100%)    PHYSICAL EXAM:  GENERAL: NAD  HEAD:  Atraumatic, Normocephalic  EYES: PERRLA  ENMT: MMM  NECK: Supple, No JVD  NERVOUS SYSTEM: AOX3, no focal neuro defect  CHEST/LUNG: CTAB, no wheeze  HEART: +systolic murmur, no m/r/g  ABDOMEN: Soft, Nontender, Nondistended; Bowel sounds present  EXTREMITIES:  2+ Peripheral Pulses, No clubbing, cyanosis  SKIN: No rashes or lesions    LABS:                        14.5   9.6   )-----------( 196      ( 02 Oct 2017 04:05 )             43.8     02 Oct 2017 04:05    141    |  102    |  25     ----------------------------<  151    4.4     |  23     |  1.09     Ca    9.6        02 Oct 2017 04:05  Phos  4.5       02 Oct 2017 04:05  Mg     2.0       02 Oct 2017 04:05    TPro  7.4    /  Alb  4.3    /  TBili  0.3    /  DBili  x      /  AST  463    /  ALT  95     /  AlkPhos  65     02 Oct 2017 04:05    PT/INR - ( 02 Oct 2017 04:05 )   PT: 11.7 sec;   INR: 1.08 ratio         PTT - ( 02 Oct 2017 04:05 )  PTT:36.1 sec  CAPILLARY BLOOD GLUCOSE

## 2017-10-02 NOTE — PROGRESS NOTE ADULT - ASSESSMENT
67F w.HTN, HLD, CAD hx s/p 10 PCI and breast Ca in remission presented to [facility] with substernal CP admitted for unstable angina with cardiac cath revealing 2vCAD ostial LAD and Lcx transferred to [facility] for 2vCABG course complicated by acute CP with anterolateral STEMI found to have significant occlusion of pLAD now s/p PCI.     #Chest pain w/significant CAD hx and ECG changes with anterolateral STEMI; [facility] demonstrated pLAD occlusion now s/p PCI--During the procedure she had 2 episodes of Vfib shocked 2x Amio 150mg x1 and Lido 100mg x1;   - Trend trops and EKG  - Brillanta and ASA  - High dose Statin   - Check TTE   - Monitor IABP closely and telemetry   - For BP control-Transiently required levo during the procedure, recently weaned off; now hypertensive; If remains  will start hydralazine PO  - Unable to start BB given sinus bradycardia 40-50s (Noted prior to current event)   - Case d/w Dr Perry and family updated at bedside. 67F w.HTN, HLD, CAD hx s/p 10 PCI and breast Ca in remission presented to [facility] with substernal CP admitted for unstable angina with cardiac cath revealing 2vCAD ostial LAD and Lcx transferred to [facility] for 2vCABG course complicated by acute CP with anterolateral STEMI found to have significant occlusion of pLAD now s/p PCI.     #Chest pain w/significant CAD hx and ECG changes with anterolateral STEMI; [facility] demonstrated pLAD occlusion now s/p PCI--During the procedure she had 2 episodes of Vfib shocked 2x Amio 150mg x1 and Lido 100mg x1  - Trend trops and EKG  - Brillanta and ASA  - High dose Statin   - Check TTE   - Monitor IABP closely and telemetry   - For BP control-Transiently required levo during the procedure, recently weaned off; now hypertensive; If remains  will start hydralazine PO  - Unable to initially start BB given sinus bradycardia 40-50s - now started esmolol gtt for hypertension 67F HTN, HLD, CAD hx s/p 10 PCI and breast Ca in remission presented to [facility] with substernal CP admitted for unstable angina with cardiac cath revealing 2vCAD ostial LAD and Lcx transferred to [facility] for 2vCABG course complicated by acute CP with anterolateral STEMI found to have significant occlusion of pLAD now s/p PCI.     #Anterolateral STEMI; [facility] demonstrated pLAD occlusion now s/p PCI--During the procedure she had 2 episodes of Vfib shocked 2x Amio 150mg x1 and Lido 100mg x1  - Trend trops and EKG  - Brillanta and ASA  - High dose Statin   - Check TTE   - Monitor IABP closely and telemetry   - For BP control-Transiently required levo during the procedure, recently weaned off; now hypertensive; If remains  will start hydralazine PO  - Unable to initially start BB given sinus bradycardia 40-50s - now started esmolol gtt for hypertension  - started captopril 6.25 tid    # DVT ppx: heparin sq 67F HTN, HLD, CAD hx s/p 10 PCI and breast Ca in remission presented to [facility] with substernal CP admitted for unstable angina with cardiac cath revealing 2vCAD ostial LAD and Lcx transferred to [facility] for 2vCABG course complicated by acute CP with anterolateral STEMI found to have significant occlusion of pLAD now s/p PCI.     #Neuro - no issues   #CV - Anterolateral STEMI; [facility] demonstrated pLAD occlusion now s/p PCI--During the procedure she had 2 episodes of Vfib shocked 2x Amio 150mg x1 and Lido 100mg x1  - Trend trops and EKG  - Brillanta and ASA  - High dose Statin   - Check TTE   - Monitor IABP closely and telemetry   - For BP control-Transiently required levo during the procedure, recently weaned off; now hypertensive; If remains  will start hydralazine PO  - Unable to initially start BB given sinus bradycardia 40-50s - now started esmolol gtt for hypertension  - started captopril 6.25 tid  - c/w heparin gtt for IABP  # Pulm - no issues  # Renal - JOYCE likely 2/2 cath, will trend creatinine. I/Os.  # GI - +nausea, possibly 2/2 MI vs. morphine related. Zofran PRN.  C/w protonix for GERD (home med)  # ID - no issues   # DVT ppx: on heparin gtt

## 2017-10-03 DIAGNOSIS — R32 UNSPECIFIED URINARY INCONTINENCE: ICD-10-CM

## 2017-10-03 DIAGNOSIS — Z85.3 PERSONAL HISTORY OF MALIGNANT NEOPLASM OF BREAST: ICD-10-CM

## 2017-10-03 DIAGNOSIS — Z95.5 PRESENCE OF CORONARY ANGIOPLASTY IMPLANT AND GRAFT: ICD-10-CM

## 2017-10-03 DIAGNOSIS — I25.2 OLD MYOCARDIAL INFARCTION: ICD-10-CM

## 2017-10-03 DIAGNOSIS — I25.110 ATHEROSCLEROTIC HEART DISEASE OF NATIVE CORONARY ARTERY WITH UNSTABLE ANGINA PECTORIS: ICD-10-CM

## 2017-10-03 DIAGNOSIS — I10 ESSENTIAL (PRIMARY) HYPERTENSION: ICD-10-CM

## 2017-10-03 DIAGNOSIS — Z79.82 LONG TERM (CURRENT) USE OF ASPIRIN: ICD-10-CM

## 2017-10-03 DIAGNOSIS — T82.855A STENOSIS OF CORONARY ARTERY STENT, INITIAL ENCOUNTER: ICD-10-CM

## 2017-10-03 DIAGNOSIS — E78.5 HYPERLIPIDEMIA, UNSPECIFIED: ICD-10-CM

## 2017-10-03 DIAGNOSIS — Z87.891 PERSONAL HISTORY OF NICOTINE DEPENDENCE: ICD-10-CM

## 2017-10-03 LAB
ALBUMIN SERPL ELPH-MCNC: 3.7 G/DL — SIGNIFICANT CHANGE UP (ref 3.3–5)
ALP SERPL-CCNC: 54 U/L — SIGNIFICANT CHANGE UP (ref 40–120)
ALT FLD-CCNC: 106 U/L RC — HIGH (ref 10–45)
ANION GAP SERPL CALC-SCNC: 15 MMOL/L — SIGNIFICANT CHANGE UP (ref 5–17)
APTT BLD: > 200 SEC (ref 27.5–37.4)
AST SERPL-CCNC: 282 U/L — HIGH (ref 10–40)
BASOPHILS # BLD AUTO: 0 K/UL — SIGNIFICANT CHANGE UP (ref 0–0.2)
BASOPHILS # BLD AUTO: 0.1 K/UL — SIGNIFICANT CHANGE UP (ref 0–0.2)
BASOPHILS # BLD AUTO: 0.1 K/UL — SIGNIFICANT CHANGE UP (ref 0–0.2)
BASOPHILS NFR BLD AUTO: 0.4 % — SIGNIFICANT CHANGE UP (ref 0–2)
BASOPHILS NFR BLD AUTO: 0.5 % — SIGNIFICANT CHANGE UP (ref 0–2)
BASOPHILS NFR BLD AUTO: 0.7 % — SIGNIFICANT CHANGE UP (ref 0–2)
BILIRUB SERPL-MCNC: 1.3 MG/DL — HIGH (ref 0.2–1.2)
BUN SERPL-MCNC: 37 MG/DL — HIGH (ref 7–23)
CALCIUM SERPL-MCNC: 9.2 MG/DL — SIGNIFICANT CHANGE UP (ref 8.4–10.5)
CHLORIDE SERPL-SCNC: 97 MMOL/L — SIGNIFICANT CHANGE UP (ref 96–108)
CK MB BLD-MCNC: 5.8 % — HIGH (ref 0–3.5)
CK MB CFR SERPL CALC: 63.2 NG/ML — HIGH (ref 0–3.8)
CK SERPL-CCNC: 1084 U/L — HIGH (ref 25–170)
CO2 SERPL-SCNC: 24 MMOL/L — SIGNIFICANT CHANGE UP (ref 22–31)
CREAT SERPL-MCNC: 1.48 MG/DL — HIGH (ref 0.5–1.3)
EOSINOPHIL # BLD AUTO: 0 K/UL — SIGNIFICANT CHANGE UP (ref 0–0.5)
EOSINOPHIL NFR BLD AUTO: 0.2 % — SIGNIFICANT CHANGE UP (ref 0–6)
EOSINOPHIL NFR BLD AUTO: 0.4 % — SIGNIFICANT CHANGE UP (ref 0–6)
EOSINOPHIL NFR BLD AUTO: 0.5 % — SIGNIFICANT CHANGE UP (ref 0–6)
GAS PNL BLDA: SIGNIFICANT CHANGE UP
GLUCOSE SERPL-MCNC: 112 MG/DL — HIGH (ref 70–99)
HCT VFR BLD CALC: 36.4 % — SIGNIFICANT CHANGE UP (ref 34.5–45)
HCT VFR BLD CALC: 38.2 % — SIGNIFICANT CHANGE UP (ref 34.5–45)
HCT VFR BLD CALC: 40.5 % — SIGNIFICANT CHANGE UP (ref 34.5–45)
HGB BLD-MCNC: 12.7 G/DL — SIGNIFICANT CHANGE UP (ref 11.5–15.5)
HGB BLD-MCNC: 12.8 G/DL — SIGNIFICANT CHANGE UP (ref 11.5–15.5)
HGB BLD-MCNC: 13.7 G/DL — SIGNIFICANT CHANGE UP (ref 11.5–15.5)
LACTATE SERPL-SCNC: 1.1 MMOL/L — SIGNIFICANT CHANGE UP (ref 0.7–2)
LYMPHOCYTES # BLD AUTO: 0.7 K/UL — LOW (ref 1–3.3)
LYMPHOCYTES # BLD AUTO: 0.9 K/UL — LOW (ref 1–3.3)
LYMPHOCYTES # BLD AUTO: 1.5 K/UL — SIGNIFICANT CHANGE UP (ref 1–3.3)
LYMPHOCYTES # BLD AUTO: 14.5 % — SIGNIFICANT CHANGE UP (ref 13–44)
LYMPHOCYTES # BLD AUTO: 6.7 % — LOW (ref 13–44)
LYMPHOCYTES # BLD AUTO: 8.6 % — LOW (ref 13–44)
MAGNESIUM SERPL-MCNC: 2.5 MG/DL — SIGNIFICANT CHANGE UP (ref 1.6–2.6)
MCHC RBC-ENTMCNC: 29.1 PG — SIGNIFICANT CHANGE UP (ref 27–34)
MCHC RBC-ENTMCNC: 29.6 PG — SIGNIFICANT CHANGE UP (ref 27–34)
MCHC RBC-ENTMCNC: 30.4 PG — SIGNIFICANT CHANGE UP (ref 27–34)
MCHC RBC-ENTMCNC: 33.4 GM/DL — SIGNIFICANT CHANGE UP (ref 32–36)
MCHC RBC-ENTMCNC: 33.8 GM/DL — SIGNIFICANT CHANGE UP (ref 32–36)
MCHC RBC-ENTMCNC: 34.9 GM/DL — SIGNIFICANT CHANGE UP (ref 32–36)
MCV RBC AUTO: 87.2 FL — SIGNIFICANT CHANGE UP (ref 80–100)
MCV RBC AUTO: 87.3 FL — SIGNIFICANT CHANGE UP (ref 80–100)
MCV RBC AUTO: 87.7 FL — SIGNIFICANT CHANGE UP (ref 80–100)
MONOCYTES # BLD AUTO: 1.3 K/UL — HIGH (ref 0–0.9)
MONOCYTES # BLD AUTO: 1.3 K/UL — HIGH (ref 0–0.9)
MONOCYTES # BLD AUTO: 1.4 K/UL — HIGH (ref 0–0.9)
MONOCYTES NFR BLD AUTO: 12 % — SIGNIFICANT CHANGE UP (ref 2–14)
MONOCYTES NFR BLD AUTO: 13.3 % — SIGNIFICANT CHANGE UP (ref 2–14)
MONOCYTES NFR BLD AUTO: 13.7 % — SIGNIFICANT CHANGE UP (ref 2–14)
NEUTROPHILS # BLD AUTO: 7.2 K/UL — SIGNIFICANT CHANGE UP (ref 1.8–7.4)
NEUTROPHILS # BLD AUTO: 8 K/UL — HIGH (ref 1.8–7.4)
NEUTROPHILS # BLD AUTO: 8.7 K/UL — HIGH (ref 1.8–7.4)
NEUTROPHILS NFR BLD AUTO: 71.2 % — SIGNIFICANT CHANGE UP (ref 43–77)
NEUTROPHILS NFR BLD AUTO: 78.5 % — HIGH (ref 43–77)
NEUTROPHILS NFR BLD AUTO: 78.8 % — HIGH (ref 43–77)
PHOSPHATE SERPL-MCNC: 4 MG/DL — SIGNIFICANT CHANGE UP (ref 2.5–4.5)
PLATELET # BLD AUTO: 120 K/UL — LOW (ref 150–400)
PLATELET # BLD AUTO: 129 K/UL — LOW (ref 150–400)
PLATELET # BLD AUTO: 137 K/UL — LOW (ref 150–400)
POTASSIUM SERPL-MCNC: 4.6 MMOL/L — SIGNIFICANT CHANGE UP (ref 3.5–5.3)
POTASSIUM SERPL-SCNC: 4.6 MMOL/L — SIGNIFICANT CHANGE UP (ref 3.5–5.3)
PROT SERPL-MCNC: 6.7 G/DL — SIGNIFICANT CHANGE UP (ref 6–8.3)
RBC # BLD: 4.18 M/UL — SIGNIFICANT CHANGE UP (ref 3.8–5.2)
RBC # BLD: 4.38 M/UL — SIGNIFICANT CHANGE UP (ref 3.8–5.2)
RBC # BLD: 4.62 M/UL — SIGNIFICANT CHANGE UP (ref 3.8–5.2)
RBC # FLD: 13.6 % — SIGNIFICANT CHANGE UP (ref 10.3–14.5)
RBC # FLD: 13.7 % — SIGNIFICANT CHANGE UP (ref 10.3–14.5)
RBC # FLD: 13.8 % — SIGNIFICANT CHANGE UP (ref 10.3–14.5)
SODIUM SERPL-SCNC: 136 MMOL/L — SIGNIFICANT CHANGE UP (ref 135–145)
TROPONIN T SERPL-MCNC: 13.06 NG/ML — HIGH (ref 0–0.06)
WBC # BLD: 10 K/UL — SIGNIFICANT CHANGE UP (ref 3.8–10.5)
WBC # BLD: 10.2 K/UL — SIGNIFICANT CHANGE UP (ref 3.8–10.5)
WBC # BLD: 11.1 K/UL — HIGH (ref 3.8–10.5)
WBC # FLD AUTO: 10 K/UL — SIGNIFICANT CHANGE UP (ref 3.8–10.5)
WBC # FLD AUTO: 10.2 K/UL — SIGNIFICANT CHANGE UP (ref 3.8–10.5)
WBC # FLD AUTO: 11.1 K/UL — HIGH (ref 3.8–10.5)

## 2017-10-03 PROCEDURE — 93010 ELECTROCARDIOGRAM REPORT: CPT

## 2017-10-03 PROCEDURE — 71010: CPT | Mod: 26

## 2017-10-03 RX ORDER — FENTANYL CITRATE 50 UG/ML
25 INJECTION INTRAVENOUS ONCE
Qty: 0 | Refills: 0 | Status: DISCONTINUED | OUTPATIENT
Start: 2017-10-03 | End: 2017-10-03

## 2017-10-03 RX ORDER — ONDANSETRON 8 MG/1
4 TABLET, FILM COATED ORAL ONCE
Qty: 0 | Refills: 0 | Status: COMPLETED | OUTPATIENT
Start: 2017-10-03 | End: 2017-10-03

## 2017-10-03 RX ORDER — SODIUM CHLORIDE 9 MG/ML
250 INJECTION INTRAMUSCULAR; INTRAVENOUS; SUBCUTANEOUS ONCE
Qty: 0 | Refills: 0 | Status: COMPLETED | OUTPATIENT
Start: 2017-10-03 | End: 2017-10-03

## 2017-10-03 RX ORDER — CARVEDILOL PHOSPHATE 80 MG/1
3.12 CAPSULE, EXTENDED RELEASE ORAL ONCE
Qty: 0 | Refills: 0 | Status: COMPLETED | OUTPATIENT
Start: 2017-10-03 | End: 2017-10-03

## 2017-10-03 RX ORDER — HEPARIN SODIUM 5000 [USP'U]/ML
5000 INJECTION INTRAVENOUS; SUBCUTANEOUS EVERY 8 HOURS
Qty: 0 | Refills: 0 | Status: DISCONTINUED | OUTPATIENT
Start: 2017-10-03 | End: 2017-10-07

## 2017-10-03 RX ORDER — HYDRALAZINE HCL 50 MG
10 TABLET ORAL ONCE
Qty: 0 | Refills: 0 | Status: COMPLETED | OUTPATIENT
Start: 2017-10-03 | End: 2017-10-03

## 2017-10-03 RX ADMIN — SODIUM CHLORIDE 3 MILLILITER(S): 9 INJECTION INTRAMUSCULAR; INTRAVENOUS; SUBCUTANEOUS at 22:03

## 2017-10-03 RX ADMIN — ATORVASTATIN CALCIUM 80 MILLIGRAM(S): 80 TABLET, FILM COATED ORAL at 22:01

## 2017-10-03 RX ADMIN — SODIUM CHLORIDE 500 MILLILITER(S): 9 INJECTION INTRAMUSCULAR; INTRAVENOUS; SUBCUTANEOUS at 12:20

## 2017-10-03 RX ADMIN — MUPIROCIN 1 APPLICATION(S): 20 OINTMENT TOPICAL at 05:20

## 2017-10-03 RX ADMIN — Medication 650 MILLIGRAM(S): at 05:13

## 2017-10-03 RX ADMIN — FENTANYL CITRATE 25 MICROGRAM(S): 50 INJECTION INTRAVENOUS at 12:22

## 2017-10-03 RX ADMIN — Medication 650 MILLIGRAM(S): at 19:41

## 2017-10-03 RX ADMIN — Medication 10 MILLIGRAM(S): at 10:19

## 2017-10-03 RX ADMIN — CARVEDILOL PHOSPHATE 6.25 MILLIGRAM(S): 80 CAPSULE, EXTENDED RELEASE ORAL at 17:33

## 2017-10-03 RX ADMIN — SODIUM CHLORIDE 3 MILLILITER(S): 9 INJECTION INTRAMUSCULAR; INTRAVENOUS; SUBCUTANEOUS at 05:13

## 2017-10-03 RX ADMIN — CARVEDILOL PHOSPHATE 3.12 MILLIGRAM(S): 80 CAPSULE, EXTENDED RELEASE ORAL at 11:23

## 2017-10-03 RX ADMIN — SODIUM CHLORIDE 250 MILLILITER(S): 9 INJECTION INTRAMUSCULAR; INTRAVENOUS; SUBCUTANEOUS at 16:13

## 2017-10-03 RX ADMIN — Medication 0.25 MILLIGRAM(S): at 11:23

## 2017-10-03 RX ADMIN — Medication 650 MILLIGRAM(S): at 22:00

## 2017-10-03 RX ADMIN — Medication 650 MILLIGRAM(S): at 10:50

## 2017-10-03 RX ADMIN — GABAPENTIN 200 MILLIGRAM(S): 400 CAPSULE ORAL at 11:23

## 2017-10-03 RX ADMIN — ONDANSETRON 4 MILLIGRAM(S): 8 TABLET, FILM COATED ORAL at 12:15

## 2017-10-03 RX ADMIN — TICAGRELOR 90 MILLIGRAM(S): 90 TABLET ORAL at 17:33

## 2017-10-03 RX ADMIN — HEPARIN SODIUM 5000 UNIT(S): 5000 INJECTION INTRAVENOUS; SUBCUTANEOUS at 22:01

## 2017-10-03 RX ADMIN — TICAGRELOR 90 MILLIGRAM(S): 90 TABLET ORAL at 06:32

## 2017-10-03 RX ADMIN — SODIUM CHLORIDE 3 MILLILITER(S): 9 INJECTION INTRAMUSCULAR; INTRAVENOUS; SUBCUTANEOUS at 14:11

## 2017-10-03 RX ADMIN — SODIUM CHLORIDE 500 MILLILITER(S): 9 INJECTION INTRAMUSCULAR; INTRAVENOUS; SUBCUTANEOUS at 05:22

## 2017-10-03 RX ADMIN — Medication 650 MILLIGRAM(S): at 10:20

## 2017-10-03 RX ADMIN — MUPIROCIN 1 APPLICATION(S): 20 OINTMENT TOPICAL at 17:34

## 2017-10-03 RX ADMIN — FENTANYL CITRATE 25 MICROGRAM(S): 50 INJECTION INTRAVENOUS at 12:00

## 2017-10-03 RX ADMIN — Medication 81 MILLIGRAM(S): at 11:23

## 2017-10-03 RX ADMIN — FENTANYL CITRATE 25 MICROGRAM(S): 50 INJECTION INTRAVENOUS at 12:34

## 2017-10-03 RX ADMIN — FENTANYL CITRATE 25 MICROGRAM(S): 50 INJECTION INTRAVENOUS at 12:15

## 2017-10-03 RX ADMIN — Medication 650 MILLIGRAM(S): at 04:11

## 2017-10-03 RX ADMIN — PANTOPRAZOLE SODIUM 40 MILLIGRAM(S): 20 TABLET, DELAYED RELEASE ORAL at 05:20

## 2017-10-03 RX ADMIN — Medication 15 MILLIGRAM(S): at 22:01

## 2017-10-03 RX ADMIN — CARVEDILOL PHOSPHATE 6.25 MILLIGRAM(S): 80 CAPSULE, EXTENDED RELEASE ORAL at 05:20

## 2017-10-03 NOTE — CHART NOTE - NSCHARTNOTEFT_GEN_A_CORE
Removal of Femoral Sheath    Pulses in the right lower extremity are audible by doppler. The patient was placed in the supine position. The insertion site was identified and the sutures were removed per protocol.  The 8 Lao femoral sheath was then removed. Direct pressure was applied for  35 minutes.     Monitoring of the right groin and both lower extremities including neuro-vascular checks and vital signs every 15 minutes x 4, then every 30 minutes x 2, then every 1 hour was ordered.    Complications: None/Other    Comments:  sandbag in place

## 2017-10-03 NOTE — PROGRESS NOTE ADULT - ATTENDING COMMENTS
Patient is seen and examined with fellow, NP and the CCU house-staff. I agree with the history, physical and the assessment and plan.  IWSTEMI s/p PCI to LAD with IABP support   trend CE  check lactate  monitor ECGs  c/w DAPT Patient is seen and examined with fellow, NP and the CCU house-staff. I agree with the history, physical and the assessment and plan.  STEMI s/p PCI with ESTELLE to pLAD  - hep gtt on hold for IABP discontinuation  - educated on the importance of DAPT   - Ace-inhibitor initiated - on hold now for JOYCE - monitor renal function  - Beta-blocker initiated  - patient is on Lipitor 80 mg daily  - trend cardiac enzymes  - TTE prior to discharge

## 2017-10-03 NOTE — PROGRESS NOTE ADULT - SUBJECTIVE AND OBJECTIVE BOX
67F HTN, HLD, CAD hx s/p 10 PCI and breast Ca in remission presented to [facility] with substernal CP admitted for unstable angina with cardiac cath revealing 2vCAD ostial LAD and Lcx transferred to [facility] for 2vCABG course complicated by acute CP with anterolateral STEMI found to have significant occlusion of pLAD now s/p PCI.       INTERVAL HPI/OVERNIGHT EVENTS:  No events overnight.  Weaned off esmolol gtt.  Increased captopril to 25 tid.  Creatinine increased today to 1.4, given 250 cc bolus, held AM captopril.      MEDICATIONS  (STANDING):  aspirin  chewable 81 milliGRAM(s) Oral daily  atorvastatin 80 milliGRAM(s) Oral at bedtime  captopril 25 milliGRAM(s) Oral every 8 hours  carvedilol 6.25 milliGRAM(s) Oral every 12 hours  gabapentin 200 milliGRAM(s) Oral daily  influenza   Vaccine 0.5 milliLiter(s) IntraMuscular once  mupirocin 2% Ointment 1 Application(s) Topical two times a day  oxybutynin XL 15 milliGRAM(s) Oral at bedtime  pantoprazole    Tablet 40 milliGRAM(s) Oral before breakfast  sodium chloride 0.9% lock flush 3 milliLiter(s) IV Push every 8 hours  ticagrelor 90 milliGRAM(s) Oral two times a day    MEDICATIONS  (PRN):  acetaminophen   Tablet. 650 milliGRAM(s) Oral every 6 hours PRN Mild Pain (1 - 3)  ALPRAZolam 0.25 milliGRAM(s) Oral three times a day PRN anxiety      Allergies    No Known Allergies    Intolerances    oxycodone (Pruritus (Mild))      REVIEW OF SYSTEMS:  CONSTITUTIONAL: No fever, fatigue  ENMT: No sinus or throat pain  RESPIRATORY: No cough, wheezing, chills or hemoptysis; No shortness of breath  CARDIOVASCULAR: No chest pain, palpitations, dizziness, or leg swelling  GASTROINTESTINAL: No abdominal or epigastric pain. No nausea, vomiting, or hematemesis; No diarrhea or constipation. No melena or hematochezia.  NEUROLOGICAL: No headaches, loss of strength, numbness, or tremors  SKIN: No itching, burning, rashes, or lesions   MUSCULOSKELETAL: No joint pain or swelling;   PSYCHIATRIC: Denies depression, anxiety      Vital Signs Last 24 Hrs  T(C): 37.4 (03 Oct 2017 04:00), Max: 37.9 (02 Oct 2017 21:00)  T(F): 99.3 (03 Oct 2017 04:00), Max: 100.3 (02 Oct 2017 21:00)  HR: 68 (03 Oct 2017 07:00) (62 - 80)  BP: --  BP(mean): --  RR: 16 (03 Oct 2017 07:00) (13 - 28)  SpO2: 95% (03 Oct 2017 07:00) (91% - 99%)    PHYSICAL EXAM:  GENERAL: NAD  HEAD:  Atraumatic, Normocephalic  EYES: PERRLA   ENMT: MMM  NECK: Supple, No JVD  NERVOUS SYSTEM: AOX3, motor and sensation grossly intact in b/l UE and b/l LE  CHEST/LUNG: Clear to auscultation bilaterally; No rales, rhonchi, wheezing, or rubs  HEART: Regular rate and rhythm; No murmurs, rubs, or gallops. No LE edema  ABDOMEN: Soft, Nontender, Nondistended; Bowel sounds present  EXTREMITIES:  2+ Peripheral Pulses, No clubbing, cyanosis  LYMPH: No lymphadenopathy noted  SKIN: No rashes or lesions    LABS:                        12.8   10.0  )-----------( 137      ( 03 Oct 2017 04:35 )             38.2     03 Oct 2017 04:35    136    |  97     |  37     ----------------------------<  112    4.6     |  24     |  1.48     Ca    9.2        03 Oct 2017 04:35  Phos  4.0       03 Oct 2017 04:35  Mg     2.5       03 Oct 2017 04:35    TPro  6.7    /  Alb  3.7    /  TBili  1.3    /  DBili  x      /  AST  282    /  ALT  106    /  AlkPhos  54     03 Oct 2017 04:35    PT/INR - ( 02 Oct 2017 04:05 )   PT: 11.7 sec;   INR: 1.08 ratio         PTT - ( 03 Oct 2017 04:35 )  PTT:> 200 sec  CAPILLARY BLOOD GLUCOSE 67F HTN, HLD, CAD hx s/p 10 PCI and breast Ca in remission presented to [facility] with substernal CP admitted for unstable angina with cardiac cath revealing 2vCAD ostial LAD and Lcx transferred to [facility] for 2vCABG course complicated by acute CP with anterolateral STEMI found to have significant occlusion of pLAD now s/p PCI.       INTERVAL HPI/OVERNIGHT EVENTS:  No events overnight.  Weaned off esmolol gtt.  Increased captopril to 25 tid.  Creatinine increased today to 1.4, given 250 cc bolus, held AM captopril.  Chest pain improved, c/o HA 3/10, intermittant.     MEDICATIONS  (STANDING):  aspirin  chewable 81 milliGRAM(s) Oral daily  atorvastatin 80 milliGRAM(s) Oral at bedtime  captopril 25 milliGRAM(s) Oral every 8 hours  carvedilol 6.25 milliGRAM(s) Oral every 12 hours  gabapentin 200 milliGRAM(s) Oral daily  influenza   Vaccine 0.5 milliLiter(s) IntraMuscular once  mupirocin 2% Ointment 1 Application(s) Topical two times a day  oxybutynin XL 15 milliGRAM(s) Oral at bedtime  pantoprazole    Tablet 40 milliGRAM(s) Oral before breakfast  sodium chloride 0.9% lock flush 3 milliLiter(s) IV Push every 8 hours  ticagrelor 90 milliGRAM(s) Oral two times a day    MEDICATIONS  (PRN):  acetaminophen   Tablet. 650 milliGRAM(s) Oral every 6 hours PRN Mild Pain (1 - 3)  ALPRAZolam 0.25 milliGRAM(s) Oral three times a day PRN anxiety      Allergies    No Known Allergies    Intolerances    oxycodone (Pruritus (Mild))      REVIEW OF SYSTEMS:  CONSTITUTIONAL: No fever, fatigue  ENMT: No sinus or throat pain  RESPIRATORY: No cough, wheezing, chills or hemoptysis; No shortness of breath  CARDIOVASCULAR: No chest pain, palpitations, dizziness, or leg swelling  GASTROINTESTINAL: No abdominal or epigastric pain. No nausea, vomiting, or hematemesis; No diarrhea or constipation. No melena or hematochezia.  NEUROLOGICAL: +headache, 3/10. No weakness, no tremors.   SKIN: No itching, burning, rashes, or lesions   MUSCULOSKELETAL: No joint pain or swelling;   PSYCHIATRIC: Denies depression, anxiety      Vital Signs Last 24 Hrs  T(C): 37.4 (03 Oct 2017 04:00), Max: 37.9 (02 Oct 2017 21:00)  T(F): 99.3 (03 Oct 2017 04:00), Max: 100.3 (02 Oct 2017 21:00)  HR: 68 (03 Oct 2017 07:00) (62 - 80)  BP: --  BP(mean): --  RR: 16 (03 Oct 2017 07:00) (13 - 28)  SpO2: 95% (03 Oct 2017 07:00) (91% - 99%)    PHYSICAL EXAM:  GENERAL: NAD  HEAD:  Atraumatic, Normocephalic  EYES: PERRLA   ENMT: MMM  NECK: Supple, No JVD  NERVOUS SYSTEM: AOX3, no focal neuro defect.  CHEST/LUNG: Clear to auscultation bilaterally; No rales, rhonchi, wheezing, or rubs  HEART: Regular rate and rhythm;+systolic murmur, skin warm and dry  ABDOMEN: Soft, Nontender, Nondistended; Bowel sounds present  EXTREMITIES:  2+ Peripheral Pulses, warm and dry.  LYMPH: No lymphadenopathy noted  SKIN: No rashes or lesions    LABS:                        12.8   10.0  )-----------( 137      ( 03 Oct 2017 04:35 )             38.2     03 Oct 2017 04:35    136    |  97     |  37     ----------------------------<  112    4.6     |  24     |  1.48     Ca    9.2        03 Oct 2017 04:35  Phos  4.0       03 Oct 2017 04:35  Mg     2.5       03 Oct 2017 04:35    TPro  6.7    /  Alb  3.7    /  TBili  1.3    /  DBili  x      /  AST  282    /  ALT  106    /  AlkPhos  54     03 Oct 2017 04:35    PT/INR - ( 02 Oct 2017 04:05 )   PT: 11.7 sec;   INR: 1.08 ratio         PTT - ( 03 Oct 2017 04:35 )  PTT:> 200 sec  CAPILLARY BLOOD GLUCOSE 67F HTN, HLD, CAD hx s/p 10 PCI and breast Ca in remission presented to [facility] with substernal CP admitted for unstable angina with cardiac cath revealing 2vCAD ostial LAD and Lcx transferred to [facility] for 2vCABG course complicated by acute CP with anterolateral STEMI found to have significant occlusion of pLAD now s/p PCI.       INTERVAL HPI/OVERNIGHT EVENTS:  No events overnight.  Weaned off esmolol gtt.  Increased captopril to 25 tid.  Creatinine increased today to 1.4, given 250 cc bolus, held AM captopril.  Chest pain improved, c/o HA 3/10, intermittent   BP increased to 150s, given hydralazine 10 mg IV x 1.      MEDICATIONS  (STANDING):  aspirin  chewable 81 milliGRAM(s) Oral daily  atorvastatin 80 milliGRAM(s) Oral at bedtime  captopril 25 milliGRAM(s) Oral every 8 hours  carvedilol 6.25 milliGRAM(s) Oral every 12 hours  gabapentin 200 milliGRAM(s) Oral daily  influenza   Vaccine 0.5 milliLiter(s) IntraMuscular once  mupirocin 2% Ointment 1 Application(s) Topical two times a day  oxybutynin XL 15 milliGRAM(s) Oral at bedtime  pantoprazole    Tablet 40 milliGRAM(s) Oral before breakfast  sodium chloride 0.9% lock flush 3 milliLiter(s) IV Push every 8 hours  ticagrelor 90 milliGRAM(s) Oral two times a day    MEDICATIONS  (PRN):  acetaminophen   Tablet. 650 milliGRAM(s) Oral every 6 hours PRN Mild Pain (1 - 3)  ALPRAZolam 0.25 milliGRAM(s) Oral three times a day PRN anxiety      Allergies    No Known Allergies    Intolerances    oxycodone (Pruritus (Mild))      REVIEW OF SYSTEMS:  CONSTITUTIONAL: No fever, fatigue  ENMT: No sinus or throat pain  RESPIRATORY: No cough, wheezing, chills or hemoptysis; No shortness of breath  CARDIOVASCULAR: No chest pain, palpitations, dizziness, or leg swelling  GASTROINTESTINAL: No abdominal or epigastric pain. No nausea, vomiting, or hematemesis; No diarrhea or constipation. No melena or hematochezia.  NEUROLOGICAL: +headache, 3/10. No weakness, no tremors.   SKIN: No itching, burning, rashes, or lesions   MUSCULOSKELETAL: No joint pain or swelling;   PSYCHIATRIC: Denies depression, anxiety      Vital Signs Last 24 Hrs  T(C): 37.4 (03 Oct 2017 04:00), Max: 37.9 (02 Oct 2017 21:00)  T(F): 99.3 (03 Oct 2017 04:00), Max: 100.3 (02 Oct 2017 21:00)  HR: 68 (03 Oct 2017 07:00) (62 - 80)  BP: --  BP(mean): --  RR: 16 (03 Oct 2017 07:00) (13 - 28)  SpO2: 95% (03 Oct 2017 07:00) (91% - 99%)    PHYSICAL EXAM:  GENERAL: NAD  HEAD:  Atraumatic, Normocephalic  EYES: PERRLA   ENMT: MMM  NECK: Supple, No JVD  NERVOUS SYSTEM: AOX3, no focal neuro defect.  CHEST/LUNG: Clear to auscultation bilaterally; No rales, rhonchi, wheezing, or rubs  HEART: Regular rate and rhythm; +systolic murmur, skin warm and dry  ABDOMEN: Soft, Nontender, Nondistended; Bowel sounds present  EXTREMITIES:  2+ Peripheral Pulses, warm and dry.  LYMPH: No lymphadenopathy noted  SKIN: No rashes or lesions    LABS:                        12.8   10.0  )-----------( 137      ( 03 Oct 2017 04:35 )             38.2     03 Oct 2017 04:35    136    |  97     |  37     ----------------------------<  112    4.6     |  24     |  1.48     Ca    9.2        03 Oct 2017 04:35  Phos  4.0       03 Oct 2017 04:35  Mg     2.5       03 Oct 2017 04:35    TPro  6.7    /  Alb  3.7    /  TBili  1.3    /  DBili  x      /  AST  282    /  ALT  106    /  AlkPhos  54     03 Oct 2017 04:35    PT/INR - ( 02 Oct 2017 04:05 )   PT: 11.7 sec;   INR: 1.08 ratio         PTT - ( 03 Oct 2017 04:35 )  PTT:> 200 sec  CAPILLARY BLOOD GLUCOSE

## 2017-10-03 NOTE — CHART NOTE - NSCHARTNOTEFT_GEN_A_CORE
====================  CCU MIDNIGHT ROUNDS  ====================    ROYER MOORE  [id number]    ====================  SUMMARY:  ====================    66 yo F HTN, HLD, CAD (PCI x 10), breast Ca, presented to  w/ UA, [facility] w/ ostial LAD and LCx dz, transferred to [facility] for CT surgery eval. Course c/b anterolateral STEMI s/p ESTELLE x 1 pLAD c/b v fib x 2 s/p defib x 2, IVP lido, IVP amio, IABP placed for coronary perfusion and transferred to CCU     ====================  NEW EVENTS:  ====================    IABP removed. R groin benign. S/p 250cc x 2 during the day. SOB this evening, improved w/ supplemental O2. No CP/SOB/palpitations. T max 100.5, UA ordered     ====================  VITALS (Last 12 hrs):  ====================    T(C): 38.1 (10-03-17 @ 19:00), Max: 38.1 (10-03-17 @ 19:00)  HR: 86 (10-03-17 @ 21:00) (70 - 94)  BP: 100/50 (10-03-17 @ 21:00) (100/50 - 124/60)  BP(mean): 63 (10-03-17 @ 21:00) (63 - 88)  RR: 24 (10-03-17 @ 21:00) (12 - 26)  SpO2: 94% (10-03-17 @ 21:00) (91% - 98%)    TELEMETRY: sinus     I&O's Summary    02 Oct 2017 07:01  -  03 Oct 2017 07:00  --------------------------------------------------------  IN: 1310.8 mL / OUT: 500 mL / NET: 810.8 mL    03 Oct 2017 07:01  -  03 Oct 2017 21:55  --------------------------------------------------------  IN: 670 mL / OUT: 500 mL / NET: 170 mL    ====================  PLAN:  ====================  CAD s/p anterolateral STEMI s/p ESTELLE x 1 pLAD c/b v fib x 2 s/p defib x 2, IABP removed, R groin benign. c/w DAPT, statin  monitor Cr, avoid nephrotoxins - hold ACE right now   patient SOB w/ b/l crackles, consider robert Vasquez CCU NP   #[id number]

## 2017-10-03 NOTE — PROGRESS NOTE ADULT - ASSESSMENT
67F HTN, HLD, CAD hx s/p 10 PCI and breast Ca in remission presented to [facility] with substernal CP admitted for unstable angina with cardiac cath revealing 2vCAD ostial LAD and Lcx transferred to [facility] for 2vCABG course complicated by acute CP with anterolateral STEMI found to have significant occlusion of pLAD now s/p PCI.     #Neuro - no issues   #CV - Anterolateral STEMI; [facility] demonstrated pLAD occlusion now s/p PCI--During the procedure she had 2 episodes of Vfib shocked 2x Amio 150mg x1 and Lido 100mg x1  - Trend trops and EKG - enzymes trending down.  - Brillanta and ASA  - High dose Statin   - TTE w/ EF 40% w/ stunning - severely hypokinetic septum, apex, anterior wall. No LV thrombus.  - Monitor IABP closely and telemetry - possible IABP removal today, holding heparin gtt   - Persistently hypertensive - able to wean off esmolol gtt, increased captopril to 25 TID, c/w coreg 6.25 BID    # Pulm - no issues  # Renal - JOYCE worsened today likely 2/2 cath + ACE - holding AM captopril, s/p 250 ccs IVF.  # GI - +nausea, possibly 2/2 MI vs. morphine related. Zofran PRN.  C/w protonix for GERD (home med)  # ID - no issues   # DVT ppx:  holding heparin gtt for possible IABP removal, will start heparin subq after removal    Victoria Wolfe PGY-2  [phone] 67F HTN, HLD, CAD hx s/p 10 PCI and breast Ca in remission presented to [facility] with substernal CP admitted for unstable angina with cardiac cath revealing 2vCAD ostial LAD and Lcx transferred to [facility] for 2vCABG course complicated by acute CP with anterolateral STEMI found to have significant occlusion of pLAD now s/p PCI.     #Neuro - no issues   #CV - Anterolateral STEMI; [facility] demonstrated pLAD occlusion now s/p PCI--During the procedure she had 2 episodes of Vfib shocked 2x Amio 150mg x1 and Lido 100mg x1  - Trend trops and EKG - enzymes trending down.  - Brillanta and ASA  - High dose Statin   - TTE w/ EF 40% w/ stunning - severely hypokinetic septum, apex, anterior wall. No LV thrombus.  - Monitor IABP closely and telemetry - possible IABP removal today, holding heparin gtt   - Persistently hypertensive - able to wean off esmolol gtt, increased captopril to 25 TID, c/w coreg 6.25 BID    # Pulm - no issues  # Renal - JOYCE worsened today likely 2/2 cath + ACE - holding AM captopril, s/p 250 ccs IVF.  # GI - Bilirubin increased to 1.3 - possibly 2/2 shock liver, will trend.  +nausea, possibly 2/2 MI vs. morphine related. Zofran PRN.  C/w protonix for GERD (home med)  # ID - no issues   # DVT ppx:  holding heparin gtt for possible IABP removal, will start heparin subq after removal    Victoria Wolfe PGY-2  [phone] 67F HTN, HLD, CAD hx s/p 10 PCI and breast Ca in remission presented to [facility] with substernal CP admitted for unstable angina with cardiac cath revealing 2vCAD ostial LAD and Lcx transferred to [facility] for 2vCABG course complicated by acute CP with anterolateral STEMI found to have significant occlusion of pLAD now s/p PCI.     #Neuro - no issues   #CV - Anterolateral STEMI; [facility] demonstrated pLAD occlusion now s/p PCI--During the procedure she had 2 episodes of Vfib shocked 2x Amio 150mg x1 and Lido 100mg x1  - Trend trops and EKG - enzymes trending down.  - on asa, brilinta, high intensity statin, coreg, captopril  - TTE w/ EF 40% w/severely hypokinetic septum, apex, anterior wall - likely 2/2 post MI stunning No LV thrombus.  - Will remove IABP today, heparin gtt d/c'd.    - Persistently hypertensive - able to wean off esmolol gtt, increased captopril to 25 TID, c/w coreg 6.25 BID    # Pulm - no issues  # Renal - JOYCE worsened today likely 2/2 cath, ACE, minimal PO intake - holding AM captopril, s/p 250 ccs IVF.    # GI - Bilirubin increased to 1.3 - possibly 2/2 shock liver, will trend. C/w protonix for GERD (home med)  # ID - no issues   # DVT ppx:  holding heparin gtt for IABP removal, will start heparin subq after removal    Victoria Wolfe PGY-2  [phone]

## 2017-10-04 LAB
ALBUMIN SERPL ELPH-MCNC: 3.3 G/DL — SIGNIFICANT CHANGE UP (ref 3.3–5)
ALP SERPL-CCNC: 55 U/L — SIGNIFICANT CHANGE UP (ref 40–120)
ALT FLD-CCNC: 76 U/L RC — HIGH (ref 10–45)
ANION GAP SERPL CALC-SCNC: 13 MMOL/L — SIGNIFICANT CHANGE UP (ref 5–17)
ANION GAP SERPL CALC-SCNC: 14 MMOL/L — SIGNIFICANT CHANGE UP (ref 5–17)
APPEARANCE UR: CLEAR — SIGNIFICANT CHANGE UP
APTT BLD: 34.6 SEC — SIGNIFICANT CHANGE UP (ref 27.5–37.4)
AST SERPL-CCNC: 126 U/L — HIGH (ref 10–40)
BASOPHILS # BLD AUTO: 0 K/UL — SIGNIFICANT CHANGE UP (ref 0–0.2)
BASOPHILS NFR BLD AUTO: 0.4 % — SIGNIFICANT CHANGE UP (ref 0–2)
BILIRUB DIRECT SERPL-MCNC: 0.5 MG/DL — HIGH (ref 0–0.2)
BILIRUB SERPL-MCNC: 1.7 MG/DL — HIGH (ref 0.2–1.2)
BILIRUB UR-MCNC: NEGATIVE — SIGNIFICANT CHANGE UP
BUN SERPL-MCNC: 21 MG/DL — SIGNIFICANT CHANGE UP (ref 7–23)
BUN SERPL-MCNC: 24 MG/DL — HIGH (ref 7–23)
CALCIUM SERPL-MCNC: 8.7 MG/DL — SIGNIFICANT CHANGE UP (ref 8.4–10.5)
CALCIUM SERPL-MCNC: 9 MG/DL — SIGNIFICANT CHANGE UP (ref 8.4–10.5)
CHLORIDE SERPL-SCNC: 100 MMOL/L — SIGNIFICANT CHANGE UP (ref 96–108)
CHLORIDE SERPL-SCNC: 99 MMOL/L — SIGNIFICANT CHANGE UP (ref 96–108)
CK MB BLD-MCNC: 4.2 % — HIGH (ref 0–3.5)
CK MB BLD-MCNC: 7 % — HIGH (ref 0–3.5)
CK MB CFR SERPL CALC: 11 NG/ML — HIGH (ref 0–3.8)
CK MB CFR SERPL CALC: 29 NG/ML — HIGH (ref 0–3.8)
CK SERPL-CCNC: 265 U/L — HIGH (ref 25–170)
CK SERPL-CCNC: 417 U/L — HIGH (ref 25–170)
CO2 SERPL-SCNC: 22 MMOL/L — SIGNIFICANT CHANGE UP (ref 22–31)
CO2 SERPL-SCNC: 24 MMOL/L — SIGNIFICANT CHANGE UP (ref 22–31)
COLOR SPEC: YELLOW — SIGNIFICANT CHANGE UP
CREAT SERPL-MCNC: 0.84 MG/DL — SIGNIFICANT CHANGE UP (ref 0.5–1.3)
CREAT SERPL-MCNC: 0.87 MG/DL — SIGNIFICANT CHANGE UP (ref 0.5–1.3)
DIFF PNL FLD: ABNORMAL
EOSINOPHIL # BLD AUTO: 0.1 K/UL — SIGNIFICANT CHANGE UP (ref 0–0.5)
EOSINOPHIL NFR BLD AUTO: 0.8 % — SIGNIFICANT CHANGE UP (ref 0–6)
GLUCOSE SERPL-MCNC: 120 MG/DL — HIGH (ref 70–99)
GLUCOSE SERPL-MCNC: 99 MG/DL — SIGNIFICANT CHANGE UP (ref 70–99)
GLUCOSE UR QL: NEGATIVE — SIGNIFICANT CHANGE UP
HAPTOGLOB SERPL-MCNC: 236 MG/DL — HIGH (ref 34–200)
HCT VFR BLD CALC: 34.3 % — LOW (ref 34.5–45)
HGB BLD-MCNC: 11.9 G/DL — SIGNIFICANT CHANGE UP (ref 11.5–15.5)
KETONES UR-MCNC: NEGATIVE — SIGNIFICANT CHANGE UP
LACTATE SERPL-SCNC: 0.9 MMOL/L — SIGNIFICANT CHANGE UP (ref 0.7–2)
LEUKOCYTE ESTERASE UR-ACNC: ABNORMAL
LYMPHOCYTES # BLD AUTO: 1.1 K/UL — SIGNIFICANT CHANGE UP (ref 1–3.3)
LYMPHOCYTES # BLD AUTO: 14 % — SIGNIFICANT CHANGE UP (ref 13–44)
MAGNESIUM SERPL-MCNC: 2.1 MG/DL — SIGNIFICANT CHANGE UP (ref 1.6–2.6)
MAGNESIUM SERPL-MCNC: 2.1 MG/DL — SIGNIFICANT CHANGE UP (ref 1.6–2.6)
MCHC RBC-ENTMCNC: 30.2 PG — SIGNIFICANT CHANGE UP (ref 27–34)
MCHC RBC-ENTMCNC: 34.6 GM/DL — SIGNIFICANT CHANGE UP (ref 32–36)
MCV RBC AUTO: 87.2 FL — SIGNIFICANT CHANGE UP (ref 80–100)
MONOCYTES # BLD AUTO: 1.1 K/UL — HIGH (ref 0–0.9)
MONOCYTES NFR BLD AUTO: 13.1 % — SIGNIFICANT CHANGE UP (ref 2–14)
NEUTROPHILS # BLD AUTO: 5.8 K/UL — SIGNIFICANT CHANGE UP (ref 1.8–7.4)
NEUTROPHILS NFR BLD AUTO: 71.7 % — SIGNIFICANT CHANGE UP (ref 43–77)
NITRITE UR-MCNC: NEGATIVE — SIGNIFICANT CHANGE UP
PH UR: 6 — SIGNIFICANT CHANGE UP (ref 5–8)
PHOSPHATE SERPL-MCNC: 2.2 MG/DL — LOW (ref 2.5–4.5)
PHOSPHATE SERPL-MCNC: 2.5 MG/DL — SIGNIFICANT CHANGE UP (ref 2.5–4.5)
PLATELET # BLD AUTO: 124 K/UL — LOW (ref 150–400)
POTASSIUM SERPL-MCNC: 3.8 MMOL/L — SIGNIFICANT CHANGE UP (ref 3.5–5.3)
POTASSIUM SERPL-MCNC: 4.3 MMOL/L — SIGNIFICANT CHANGE UP (ref 3.5–5.3)
POTASSIUM SERPL-SCNC: 3.8 MMOL/L — SIGNIFICANT CHANGE UP (ref 3.5–5.3)
POTASSIUM SERPL-SCNC: 4.3 MMOL/L — SIGNIFICANT CHANGE UP (ref 3.5–5.3)
PROT SERPL-MCNC: 6.2 G/DL — SIGNIFICANT CHANGE UP (ref 6–8.3)
PROT UR-MCNC: 100 MG/DL
RBC # BLD: 3.94 M/UL — SIGNIFICANT CHANGE UP (ref 3.8–5.2)
RBC # BLD: 4.1 M/UL — SIGNIFICANT CHANGE UP (ref 3.8–5.2)
RBC # FLD: 13.5 % — SIGNIFICANT CHANGE UP (ref 10.3–14.5)
RETICS #: 49 K/UL — SIGNIFICANT CHANGE UP (ref 25–125)
RETICS/RBC NFR: 1.2 % — SIGNIFICANT CHANGE UP (ref 0.5–2.5)
SODIUM SERPL-SCNC: 136 MMOL/L — SIGNIFICANT CHANGE UP (ref 135–145)
SODIUM SERPL-SCNC: 136 MMOL/L — SIGNIFICANT CHANGE UP (ref 135–145)
SP GR SPEC: 1.02 — SIGNIFICANT CHANGE UP (ref 1.01–1.02)
TROPONIN T SERPL-MCNC: 5.01 NG/ML — HIGH (ref 0–0.06)
TROPONIN T SERPL-MCNC: 6.25 NG/ML — HIGH (ref 0–0.06)
UROBILINOGEN FLD QL: 1
WBC # BLD: 8.2 K/UL — SIGNIFICANT CHANGE UP (ref 3.8–10.5)
WBC # FLD AUTO: 8.2 K/UL — SIGNIFICANT CHANGE UP (ref 3.8–10.5)

## 2017-10-04 PROCEDURE — 71010: CPT | Mod: 26,77

## 2017-10-04 PROCEDURE — 71010: CPT | Mod: 26

## 2017-10-04 PROCEDURE — 93010 ELECTROCARDIOGRAM REPORT: CPT

## 2017-10-04 PROCEDURE — 99233 SBSQ HOSP IP/OBS HIGH 50: CPT | Mod: GC

## 2017-10-04 RX ORDER — METOPROLOL TARTRATE 50 MG
25 TABLET ORAL ONCE
Qty: 0 | Refills: 0 | Status: COMPLETED | OUTPATIENT
Start: 2017-10-04 | End: 2017-10-04

## 2017-10-04 RX ORDER — METOPROLOL TARTRATE 50 MG
50 TABLET ORAL DAILY
Qty: 0 | Refills: 0 | Status: DISCONTINUED | OUTPATIENT
Start: 2017-10-05 | End: 2017-10-07

## 2017-10-04 RX ORDER — FUROSEMIDE 40 MG
20 TABLET ORAL DAILY
Qty: 0 | Refills: 0 | Status: DISCONTINUED | OUTPATIENT
Start: 2017-10-04 | End: 2017-10-04

## 2017-10-04 RX ORDER — FUROSEMIDE 40 MG
20 TABLET ORAL DAILY
Qty: 0 | Refills: 0 | Status: DISCONTINUED | OUTPATIENT
Start: 2017-10-04 | End: 2017-10-05

## 2017-10-04 RX ORDER — METOPROLOL TARTRATE 50 MG
50 TABLET ORAL DAILY
Qty: 0 | Refills: 0 | Status: DISCONTINUED | OUTPATIENT
Start: 2017-10-04 | End: 2017-10-04

## 2017-10-04 RX ADMIN — Medication 650 MILLIGRAM(S): at 06:00

## 2017-10-04 RX ADMIN — PANTOPRAZOLE SODIUM 40 MILLIGRAM(S): 20 TABLET, DELAYED RELEASE ORAL at 05:18

## 2017-10-04 RX ADMIN — Medication 63.75 MILLIMOLE(S): at 07:59

## 2017-10-04 RX ADMIN — Medication 20 MILLIGRAM(S): at 21:17

## 2017-10-04 RX ADMIN — ATORVASTATIN CALCIUM 80 MILLIGRAM(S): 80 TABLET, FILM COATED ORAL at 21:17

## 2017-10-04 RX ADMIN — Medication 20 MILLIGRAM(S): at 11:14

## 2017-10-04 RX ADMIN — Medication 81 MILLIGRAM(S): at 11:14

## 2017-10-04 RX ADMIN — SODIUM CHLORIDE 3 MILLILITER(S): 9 INJECTION INTRAMUSCULAR; INTRAVENOUS; SUBCUTANEOUS at 14:23

## 2017-10-04 RX ADMIN — Medication 650 MILLIGRAM(S): at 15:36

## 2017-10-04 RX ADMIN — TICAGRELOR 90 MILLIGRAM(S): 90 TABLET ORAL at 17:16

## 2017-10-04 RX ADMIN — Medication 650 MILLIGRAM(S): at 16:35

## 2017-10-04 RX ADMIN — HEPARIN SODIUM 5000 UNIT(S): 5000 INJECTION INTRAVENOUS; SUBCUTANEOUS at 21:17

## 2017-10-04 RX ADMIN — Medication 650 MILLIGRAM(S): at 05:18

## 2017-10-04 RX ADMIN — TICAGRELOR 90 MILLIGRAM(S): 90 TABLET ORAL at 05:18

## 2017-10-04 RX ADMIN — Medication 650 MILLIGRAM(S): at 21:50

## 2017-10-04 RX ADMIN — MUPIROCIN 1 APPLICATION(S): 20 OINTMENT TOPICAL at 05:18

## 2017-10-04 RX ADMIN — HEPARIN SODIUM 5000 UNIT(S): 5000 INJECTION INTRAVENOUS; SUBCUTANEOUS at 05:18

## 2017-10-04 RX ADMIN — SODIUM CHLORIDE 3 MILLILITER(S): 9 INJECTION INTRAMUSCULAR; INTRAVENOUS; SUBCUTANEOUS at 21:19

## 2017-10-04 RX ADMIN — CARVEDILOL PHOSPHATE 6.25 MILLIGRAM(S): 80 CAPSULE, EXTENDED RELEASE ORAL at 05:18

## 2017-10-04 RX ADMIN — HEPARIN SODIUM 5000 UNIT(S): 5000 INJECTION INTRAVENOUS; SUBCUTANEOUS at 14:23

## 2017-10-04 RX ADMIN — Medication 15 MILLIGRAM(S): at 21:18

## 2017-10-04 RX ADMIN — Medication 25 MILLIGRAM(S): at 17:16

## 2017-10-04 RX ADMIN — Medication 650 MILLIGRAM(S): at 22:20

## 2017-10-04 RX ADMIN — MUPIROCIN 1 APPLICATION(S): 20 OINTMENT TOPICAL at 17:16

## 2017-10-04 RX ADMIN — SODIUM CHLORIDE 3 MILLILITER(S): 9 INJECTION INTRAMUSCULAR; INTRAVENOUS; SUBCUTANEOUS at 05:14

## 2017-10-04 NOTE — PROGRESS NOTE ADULT - ATTENDING COMMENTS
Patient is seen and examined with fellow, NP and the CCU house-staff. I agree with the history, physical and the assessment and plan.  s/p IABP removal with evidence of ADHF today  diurese with Lasix  change coreg to toprol   if BP allows, will start low dose ace inh

## 2017-10-04 NOTE — DIETITIAN INITIAL EVALUATION ADULT. - ORAL INTAKE PTA
Pt states she was recently visiting [place] for 6 weeks, po intakes were good during the trip, had plenty of Italian cuisine. Pt states she usually follows Mediterranean cuisine at home, plenty of fish, lean meats and vegetables with whole grain./good

## 2017-10-04 NOTE — PROGRESS NOTE ADULT - SUBJECTIVE AND OBJECTIVE BOX
Patient is a 67y old  Female who presents with a chief complaint of cardiac surgery (29 Sep 2017 22:45)       INTERVAL HPI/OVERNIGHT EVENTS:  67F w/HTN, HLD, CAD hx s/p 10 PCI and breast Ca in remission presented to [facility] with substernal CP admitted for unstable angina with cardiac cath revealing 2vCAD ostial LAD and Lcx transferred to [facility] for 2vCABG course complicated by acute CP with anterolateral STEMI found to have significant occlusion of pLAD and LCX now s/p PCI    MEDICATIONS  (STANDING):  aspirin  chewable 81 milliGRAM(s) Oral daily  atorvastatin 80 milliGRAM(s) Oral at bedtime  carvedilol 6.25 milliGRAM(s) Oral every 12 hours  gabapentin 200 milliGRAM(s) Oral daily  heparin  Injectable 5000 Unit(s) SubCutaneous every 8 hours  influenza   Vaccine 0.5 milliLiter(s) IntraMuscular once  mupirocin 2% Ointment 1 Application(s) Topical two times a day  oxybutynin XL 15 milliGRAM(s) Oral at bedtime  pantoprazole    Tablet 40 milliGRAM(s) Oral before breakfast  sodium chloride 0.9% lock flush 3 milliLiter(s) IV Push every 8 hours  sodium phosphate IVPB 15 milliMole(s) IV Intermittent once  ticagrelor 90 milliGRAM(s) Oral two times a day    MEDICATIONS  (PRN):  acetaminophen   Tablet. 650 milliGRAM(s) Oral every 6 hours PRN Mild Pain (1 - 3)  ALPRAZolam 0.25 milliGRAM(s) Oral three times a day PRN anxiety      Allergies  No Known Allergies    Intolerances  oxycodone (Pruritus (Mild))      REVIEW OF SYSTEMS:  CONSTITUTIONAL: No fever  ENMT:  No sinus or throat pain  RESPIRATORY: No cough, wheezing, chills or hemoptysis; No shortness of breath  CARDIOVASCULAR: No chest pain, palpitations, dizziness, or leg swelling  GASTROINTESTINAL: No abdominal or epigastric pain. No nausea, vomiting.  No diarrhea or constipation.   GENITOURINARY: No dysuria  NEUROLOGICAL: No headaches, loss of strength, numbness, or tremors  SKIN: No itching, burning, rashes, or lesions   MUSCULOSKELETAL: No joint pain or swelling;   PSYCHIATRIC: Denies depression, anxiety      Vital Signs Last 24 Hrs  T(C): 37.4 (04 Oct 2017 05:00), Max: 38.1 (03 Oct 2017 19:00)  T(F): 99.4 (04 Oct 2017 05:00), Max: 100.5 (03 Oct 2017 19:00)  HR: 76 (04 Oct 2017 07:00) (66 - 94)  BP: 90/50 (04 Oct 2017 07:00) (90/50 - 124/60)  BP(mean): 62 (04 Oct 2017 07:00) (58 - 88)  RR: 15 (04 Oct 2017 07:00) (12 - 26)  SpO2: 97% (04 Oct 2017 07:00) (91% - 100%)    PHYSICAL EXAM:  GENERAL: NAD  HEAD:  Atraumatic, Normocephalic  EYES: PERRLA  ENMT: MMM  NECK: Supple, No JVD  NERVOUS SYSTEM: AOX3, no focal neuro defect  CHEST/LUNG: CTAB/ no wheeze   HEART: RRR, no m/r/g  ABDOMEN: s/nt/nd  EXTREMITIES:  2+ Peripheral Pulses  LYMPH: No lymphadenopathy noted  SKIN: No rashes or lesions    LABS:                        11.9   8.2   )-----------( 124      ( 04 Oct 2017 05:57 )             34.3     04 Oct 2017 05:57    136    |  100    |  24     ----------------------------<  99     4.3     |  22     |  0.84     Ca    8.7        04 Oct 2017 05:57  Phos  2.2       04 Oct 2017 05:57  Mg     2.1       04 Oct 2017 05:57    TPro  6.2    /  Alb  3.3    /  TBili  1.7    /  DBili  x      /  AST  126    /  ALT  76     /  AlkPhos  55     04 Oct 2017 05:57    PTT - ( 04 Oct 2017 05:57 )  PTT:34.6 sec  CAPILLARY BLOOD GLUCOSE        BLOOD CULTURE    RADIOLOGY & ADDITIONAL TESTS:    Imaging Personally Reviewed:  [ ] YES     Consultant(s) Notes Reviewed:      Care Discussed with Consultants/Other Providers: 67F w/HTN, HLD, CAD hx s/p 10 PCI and breast Ca in remission presented to [facility] with substernal CP admitted for unstable angina with cardiac cath revealing 2vCAD ostial LAD and Lcx transferred to [facility] for 2vCABG course complicated by acute CP with anterolateral STEMI found to have significant occlusion of pLAD and LCX now s/p PCI       INTERVAL HPI/OVERNIGHT EVENTS:  no events overnight. +HA this morning, improved with tylenol.  +cough, started this am.      MEDICATIONS  (STANDING):  aspirin  chewable 81 milliGRAM(s) Oral daily  atorvastatin 80 milliGRAM(s) Oral at bedtime  carvedilol 6.25 milliGRAM(s) Oral every 12 hours  gabapentin 200 milliGRAM(s) Oral daily  heparin  Injectable 5000 Unit(s) SubCutaneous every 8 hours  influenza   Vaccine 0.5 milliLiter(s) IntraMuscular once  mupirocin 2% Ointment 1 Application(s) Topical two times a day  oxybutynin XL 15 milliGRAM(s) Oral at bedtime  pantoprazole    Tablet 40 milliGRAM(s) Oral before breakfast  sodium chloride 0.9% lock flush 3 milliLiter(s) IV Push every 8 hours  sodium phosphate IVPB 15 milliMole(s) IV Intermittent once  ticagrelor 90 milliGRAM(s) Oral two times a day    MEDICATIONS  (PRN):  acetaminophen   Tablet. 650 milliGRAM(s) Oral every 6 hours PRN Mild Pain (1 - 3)  ALPRAZolam 0.25 milliGRAM(s) Oral three times a day PRN anxiety      Allergies  No Known Allergies    Intolerances  oxycodone (Pruritus (Mild))      REVIEW OF SYSTEMS:  CONSTITUTIONAL: No fever  ENMT:  No sinus or throat pain  RESPIRATORY: +cough   CARDIOVASCULAR: No chest pain, palpitations, dizziness, or leg swelling  GASTROINTESTINAL: No abdominal or epigastric pain. No nausea, vomiting.  No diarrhea or constipation.   GENITOURINARY: No dysuria  NEUROLOGICAL: +headache  SKIN: No itching, burning, rashes, or lesions   MUSCULOSKELETAL: No joint pain or swelling;   PSYCHIATRIC: Denies depression, anxiety      Vital Signs Last 24 Hrs  T(C): 37.4 (04 Oct 2017 05:00), Max: 38.1 (03 Oct 2017 19:00)  T(F): 99.4 (04 Oct 2017 05:00), Max: 100.5 (03 Oct 2017 19:00)  HR: 76 (04 Oct 2017 07:00) (66 - 94)  BP: 90/50 (04 Oct 2017 07:00) (90/50 - 124/60)  BP(mean): 62 (04 Oct 2017 07:00) (58 - 88)  RR: 15 (04 Oct 2017 07:00) (12 - 26)  SpO2: 97% (04 Oct 2017 07:00) (91% - 100%)    PHYSICAL EXAM:  GENERAL: NAD  HEAD:  Atraumatic, Normocephalic  EYES: PERRLA  ENMT: MMM  NECK: Supple, No JVD  NERVOUS SYSTEM: AOX3, no focal neuro defect  CHEST/LUNG: +crackles at bases, no wheeze   HEART: RRR, no m/r/g  ABDOMEN: s/nt/nd  EXTREMITIES:  2+ Peripheral Pulses  LYMPH: No lymphadenopathy noted  SKIN: No rashes or lesions    LABS:                        11.9   8.2   )-----------( 124      ( 04 Oct 2017 05:57 )             34.3     04 Oct 2017 05:57    136    |  100    |  24     ----------------------------<  99     4.3     |  22     |  0.84     Ca    8.7        04 Oct 2017 05:57  Phos  2.2       04 Oct 2017 05:57  Mg     2.1       04 Oct 2017 05:57    TPro  6.2    /  Alb  3.3    /  TBili  1.7    /  DBili  x      /  AST  126    /  ALT  76     /  AlkPhos  55     04 Oct 2017 05:57    PTT - ( 04 Oct 2017 05:57 )  PTT:34.6 sec  CAPILLARY BLOOD GLUCOSE        BLOOD CULTURE    RADIOLOGY & ADDITIONAL TESTS:    Imaging Personally Reviewed:  [X] YES     Consultant(s) Notes Reviewed:      Care Discussed with Consultants/Other Providers:

## 2017-10-04 NOTE — CHART NOTE - NSCHARTNOTEFT_GEN_A_CORE
====================  CCU MIDNIGHT ROUNDS  ====================    ROYER MOORE  [id number]    ====================  SUMMARY:  ====================    68 yo F HTN, HLD, CAD (PCI x 10), breast Ca, presented to  w/ UA, [facility] w/ ostial LAD and LCx dz, transferred to [facility] for CT surgery eval. Course c/b anterolateral STEMI s/p ESTELLE x 1 pLAD c/b v fib x 2 s/p defib x 2, IVP lido, IVP amio, IABP placed for coronary perfusion and transferred to CCU     ====================  NEW EVENTS:  ====================    s/p 20 mg IVP lasix x 2 w/ good response, less SOB. no cp/palpitations   ====================  VITALS (Last 12 hrs):  ====================    T(C): 36.9 (10-04-17 @ 19:00), Max: 37.2 (10-04-17 @ 14:00)  HR: 76 (10-04-17 @ 22:00) (76 - 100)  BP: 97/54 (10-04-17 @ 22:00) (90/55 - 119/57)  BP(mean): 68 (10-04-17 @ 22:00) (62 - 82)  RR: 23 (10-04-17 @ 22:00) (16 - 32)  SpO2: 97% (10-04-17 @ 22:00) (94% - 100%)    TELEMETRY: sinus     I&O's Summary    03 Oct 2017 07:01  -  04 Oct 2017 07:00  --------------------------------------------------------  IN: 790 mL / OUT: 1050 mL / NET: -260 mL    04 Oct 2017 07:01  -  04 Oct 2017 22:57  --------------------------------------------------------  IN: 930 mL / OUT: 1700 mL / NET: -770 mL    ====================  PLAN:  ====================  CAD s/p anterolateral STEMI s/p ESTELLE x 1 pLAD c/b v fib x 2 s/p defib x 2, IABP removed 10/3. c/w DAPT, statin, started on toprol  Cr improved, monitor Cr in AM   c/w daily robert Vasquez CCU NP   #[id number]

## 2017-10-04 NOTE — PROGRESS NOTE ADULT - ASSESSMENT
67F HTN, HLD, CAD hx s/p 10 PCI and breast Ca in remission presented to [facility] with substernal CP admitted for unstable angina with cardiac cath revealing 2vCAD ostial LAD and Lcx transferred to [facility] for 2vCABG course complicated by acute CP with anterolateral STEMI found to have significant occlusion of pLAD now s/p PCI.     #Neuro - no issues   #CV - Anterolateral STEMI; [facility] demonstrated pLAD occlusion now s/p PCI--During the procedure she had 2 episodes of Vfib shocked 2x Amio 150mg x1 and Lido 100mg x1  - Trend trops and EKG - enzymes trending down.  - on asa, brilinta, high intensity statin, coreg, captopril  - TTE w/ EF 40% w/severely hypokinetic septum, apex, anterior wall - likely 2/2 post MI stunning No LV thrombus.    - IABP removed    # Pulm - no issues  # Renal - had JOYCE now improving, likely 2/2 dehydration as improved after IVF yesterday  # GI - Bilirubin increased to 1.7. AST/ALT coming down, will trend.  C/w protonix for GERD (home med)  # ID - febrile to 100.5. F/u UA, CXR.  no clinincal signs of infection, no leukocytosis.  # Heme - thrombocytopenia - plts stabalized. HSQ restarted. will trend thrombocytopenia, possibly 2/2 shearing from IABP.  # DVT ppx:  hsq    Victoria Wolfe PGY-2  [phone] 67F HTN, HLD, CAD hx s/p 10 PCI and breast Ca in remission presented to [facility] with substernal CP admitted for unstable angina with cardiac cath revealing 2vCAD ostial LAD and Lcx transferred to [facility] for 2vCABG course complicated by acute CP with anterolateral STEMI found to have significant occlusion of pLAD now s/p PCI.     #Neuro - no issues   #CV - Anterolateral STEMI; [facility] demonstrated pLAD occlusion now s/p PCI--During the procedure she had 2 episodes of Vfib shocked 2x Amio 150mg x1 and Lido 100mg x1  - Trend trops and EKG - enzymes trending down.  - on asa, brilinta, high intensity statin, coreg, captopril  - TTE w/ EF 40% w/severely hypokinetic septum, apex, anterior wall - likely 2/2 post MI stunning No LV thrombus.    - IABP removed    # Pulm - no issues  # Renal - had JOYCE now improving, likely 2/2 dehydration as improved after IVF yesterday  # GI - Bilirubin increased to 1.7. AST/ALT coming down, will trend, f/u fractionated bili.  C/w protonix for GERD (home med)  # ID - febrile to 100.5. F/u UA, CXR.  no clinincal signs of infection, no leukocytosis. CXR w/o PNA, c/w pulmonary edema. UA negative for infection.    # Heme - thrombocytopenia - plts stabalized. HSQ restarted. will trend thrombocytopenia, possibly 2/2 shearing from IABP.  # DVT ppx:  hsq    Victoria Wolfe PGY-2  [phone] 67F HTN, HLD, CAD hx s/p 10 PCI and breast Ca in remission presented to [facility] with substernal CP admitted for unstable angina with cardiac cath revealing 2vCAD ostial LAD and Lcx transferred to [facility] for 2vCABG course complicated by acute CP with anterolateral STEMI found to have significant occlusion of pLAD now s/p PCI.     #Neuro - no issues   #CV - Anterolateral STEMI; [facility] demonstrated pLAD occlusion now s/p PCI--During the procedure she had 2 episodes of Vfib shocked 2x Amio 150mg x1 and Lido 100mg x1  - Trend trops and EKG - enzymes trending down.  - on asa, brilinta, high intensity statin, coreg, captopril  - TTE w/ EF 40% w/severely hypokinetic septum, apex, anterior wall - likely 2/2 post MI stunning No LV thrombus.    - IABP removed  - appears fluid overloaded today, will give lasix 20 mg PO x 1, monitor I/O.  # Pulm - no issues  # Renal - had JOYCE now improving, likely 2/2 dehydration as improved after IVF yesterday  # GI - Bilirubin increased to 1.7. AST/ALT coming down, will trend, f/u fractionated bili.  C/w protonix for GERD (home med)  # ID - febrile to 100.5. F/u UA, CXR.  no clinincal signs of infection, no leukocytosis. CXR w/o PNA, c/w pulmonary edema. UA negative for infection.    # Heme - thrombocytopenia - plts stabalized. HSQ restarted. will trend thrombocytopenia, possibly 2/2 shearing from IABP.  # DVT ppx:  hsq    Victoria Wolfe PGY-2  [phone]

## 2017-10-04 NOTE — DIETITIAN INITIAL EVALUATION ADULT. - NS AS NUTRI INTERV MEALS SNACK
Reviewed alternate menu options and menu ordering procedure. Provide food preferences within therapeutic diet when requested. RD to provide Greek Yogurt for breakfast, food preferences obtained and will be honored.

## 2017-10-04 NOTE — DIETITIAN INITIAL EVALUATION ADULT. - NS AS NUTRI INTERV MEDICAL AND FOOD SUPPLEMENTS
Patient stated the insurance company is questioning the sleep study she had done. Patient is requesting a call to day to discuss this.   Recommend Ensure Enlive 1 x day

## 2017-10-04 NOTE — DIETITIAN INITIAL EVALUATION ADULT. - PERTINENT LABORATORY DATA
Na 136 [135 - 145], K+ 4.3 [3.5 - 5.3], BUN 24 [7 - 23], Cr 0.84 [0.50 - 1.30], BG 99 [70 - 99], Phos 2.2 [2.5 - 4.5], Alk Phos 55 [40 - 120],  [10 - 40], ALT 76 [10 - 45], Mg 2.1 [1.6 - 2.6], Ca 8.7 [8.4 - 10.5], HbA1c 5.7%

## 2017-10-04 NOTE — DIETITIAN INITIAL EVALUATION ADULT. - ENERGY NEEDS
Height: 64 inches, Weight: 157 pounds  BMI:26.9 kg/m2 IBW:120 pounds (+/-10%), %IBW:131%  Pertinent Info: Per chart, 68 y/o female with PMH of CAD s/p 10 stents, breast cancer in remission, admitted with STEMI and significant occlusion of pLAD and LCX s/p PCI and IABP removal. No edema, no pressure ulcers noted at this time.

## 2017-10-04 NOTE — DIETITIAN INITIAL EVALUATION ADULT. - OTHER INFO
Pt seen for LOS in CCU, reports decreased appetite since admission, also does not like institutional foods. Pt's daughter brought food from home. Noted 0-50% po intakes per flowsheet. No GI distress reported, but no BM for several days attributed to poor po intakes. Pt denies chewing/swallowing difficulties. NKFA, but does not eat lamb. PTA takes multivitamin and biotin supplements.

## 2017-10-05 LAB
ALBUMIN SERPL ELPH-MCNC: 3.3 G/DL — SIGNIFICANT CHANGE UP (ref 3.3–5)
ALP SERPL-CCNC: 94 U/L — SIGNIFICANT CHANGE UP (ref 40–120)
ALT FLD-CCNC: 59 U/L RC — HIGH (ref 10–45)
ANION GAP SERPL CALC-SCNC: 12 MMOL/L — SIGNIFICANT CHANGE UP (ref 5–17)
AST SERPL-CCNC: 77 U/L — HIGH (ref 10–40)
BILIRUB SERPL-MCNC: 1.5 MG/DL — HIGH (ref 0.2–1.2)
BUN SERPL-MCNC: 20 MG/DL — SIGNIFICANT CHANGE UP (ref 7–23)
CALCIUM SERPL-MCNC: 8.9 MG/DL — SIGNIFICANT CHANGE UP (ref 8.4–10.5)
CHLORIDE SERPL-SCNC: 101 MMOL/L — SIGNIFICANT CHANGE UP (ref 96–108)
CK MB BLD-MCNC: 4.1 % — HIGH (ref 0–3.5)
CK MB CFR SERPL CALC: 7.6 NG/ML — HIGH (ref 0–3.8)
CK SERPL-CCNC: 187 U/L — HIGH (ref 25–170)
CO2 SERPL-SCNC: 25 MMOL/L — SIGNIFICANT CHANGE UP (ref 22–31)
CREAT SERPL-MCNC: 0.76 MG/DL — SIGNIFICANT CHANGE UP (ref 0.5–1.3)
GLUCOSE SERPL-MCNC: 98 MG/DL — SIGNIFICANT CHANGE UP (ref 70–99)
HCT VFR BLD CALC: 35.5 % — SIGNIFICANT CHANGE UP (ref 34.5–45)
HGB BLD-MCNC: 12 G/DL — SIGNIFICANT CHANGE UP (ref 11.5–15.5)
MAGNESIUM SERPL-MCNC: 2 MG/DL — SIGNIFICANT CHANGE UP (ref 1.6–2.6)
MCHC RBC-ENTMCNC: 29.8 PG — SIGNIFICANT CHANGE UP (ref 27–34)
MCHC RBC-ENTMCNC: 33.8 GM/DL — SIGNIFICANT CHANGE UP (ref 32–36)
MCV RBC AUTO: 88.1 FL — SIGNIFICANT CHANGE UP (ref 80–100)
NT-PROBNP SERPL-SCNC: HIGH PG/ML (ref 0–300)
PHOSPHATE SERPL-MCNC: 2.6 MG/DL — SIGNIFICANT CHANGE UP (ref 2.5–4.5)
PLATELET # BLD AUTO: 134 K/UL — LOW (ref 150–400)
POTASSIUM SERPL-MCNC: 4.5 MMOL/L — SIGNIFICANT CHANGE UP (ref 3.5–5.3)
POTASSIUM SERPL-SCNC: 4.5 MMOL/L — SIGNIFICANT CHANGE UP (ref 3.5–5.3)
PROT SERPL-MCNC: 6.7 G/DL — SIGNIFICANT CHANGE UP (ref 6–8.3)
RBC # BLD: 4.03 M/UL — SIGNIFICANT CHANGE UP (ref 3.8–5.2)
RBC # FLD: 13.4 % — SIGNIFICANT CHANGE UP (ref 10.3–14.5)
SODIUM SERPL-SCNC: 138 MMOL/L — SIGNIFICANT CHANGE UP (ref 135–145)
TROPONIN T SERPL-MCNC: 6.02 NG/ML — HIGH (ref 0–0.06)
WBC # BLD: 6.6 K/UL — SIGNIFICANT CHANGE UP (ref 3.8–10.5)
WBC # FLD AUTO: 6.6 K/UL — SIGNIFICANT CHANGE UP (ref 3.8–10.5)

## 2017-10-05 PROCEDURE — 99233 SBSQ HOSP IP/OBS HIGH 50: CPT | Mod: GC

## 2017-10-05 PROCEDURE — 71010: CPT | Mod: 26

## 2017-10-05 PROCEDURE — 93321 DOPPLER ECHO F-UP/LMTD STD: CPT | Mod: 26

## 2017-10-05 PROCEDURE — 93308 TTE F-UP OR LMTD: CPT | Mod: 26

## 2017-10-05 PROCEDURE — 93010 ELECTROCARDIOGRAM REPORT: CPT

## 2017-10-05 PROCEDURE — 90791 PSYCH DIAGNOSTIC EVALUATION: CPT

## 2017-10-05 RX ORDER — FUROSEMIDE 40 MG
20 TABLET ORAL ONCE
Qty: 0 | Refills: 0 | Status: COMPLETED | OUTPATIENT
Start: 2017-10-05 | End: 2017-10-05

## 2017-10-05 RX ORDER — SENNA PLUS 8.6 MG/1
2 TABLET ORAL AT BEDTIME
Qty: 0 | Refills: 0 | Status: DISCONTINUED | OUTPATIENT
Start: 2017-10-05 | End: 2017-10-07

## 2017-10-05 RX ORDER — POTASSIUM CHLORIDE 20 MEQ
40 PACKET (EA) ORAL ONCE
Qty: 0 | Refills: 0 | Status: COMPLETED | OUTPATIENT
Start: 2017-10-05 | End: 2017-10-05

## 2017-10-05 RX ORDER — CAPTOPRIL 12.5 MG/1
6.25 TABLET ORAL EVERY 8 HOURS
Qty: 0 | Refills: 0 | Status: DISCONTINUED | OUTPATIENT
Start: 2017-10-05 | End: 2017-10-06

## 2017-10-05 RX ORDER — CAPTOPRIL 12.5 MG/1
6.25 TABLET ORAL ONCE
Qty: 0 | Refills: 0 | Status: COMPLETED | OUTPATIENT
Start: 2017-10-05 | End: 2017-10-05

## 2017-10-05 RX ADMIN — CAPTOPRIL 6.25 MILLIGRAM(S): 12.5 TABLET ORAL at 13:00

## 2017-10-05 RX ADMIN — Medication 15 MILLIGRAM(S): at 21:10

## 2017-10-05 RX ADMIN — Medication 50 MILLIGRAM(S): at 06:02

## 2017-10-05 RX ADMIN — PANTOPRAZOLE SODIUM 40 MILLIGRAM(S): 20 TABLET, DELAYED RELEASE ORAL at 06:02

## 2017-10-05 RX ADMIN — SODIUM CHLORIDE 3 MILLILITER(S): 9 INJECTION INTRAMUSCULAR; INTRAVENOUS; SUBCUTANEOUS at 13:09

## 2017-10-05 RX ADMIN — TICAGRELOR 90 MILLIGRAM(S): 90 TABLET ORAL at 06:01

## 2017-10-05 RX ADMIN — SODIUM CHLORIDE 3 MILLILITER(S): 9 INJECTION INTRAMUSCULAR; INTRAVENOUS; SUBCUTANEOUS at 21:06

## 2017-10-05 RX ADMIN — Medication 81 MILLIGRAM(S): at 13:00

## 2017-10-05 RX ADMIN — TICAGRELOR 90 MILLIGRAM(S): 90 TABLET ORAL at 17:19

## 2017-10-05 RX ADMIN — HEPARIN SODIUM 5000 UNIT(S): 5000 INJECTION INTRAVENOUS; SUBCUTANEOUS at 13:01

## 2017-10-05 RX ADMIN — Medication 40 MILLIEQUIVALENT(S): at 04:22

## 2017-10-05 RX ADMIN — Medication 20 MILLIGRAM(S): at 06:01

## 2017-10-05 RX ADMIN — ATORVASTATIN CALCIUM 80 MILLIGRAM(S): 80 TABLET, FILM COATED ORAL at 21:13

## 2017-10-05 RX ADMIN — HEPARIN SODIUM 5000 UNIT(S): 5000 INJECTION INTRAVENOUS; SUBCUTANEOUS at 21:07

## 2017-10-05 RX ADMIN — SENNA PLUS 1 TABLET(S): 8.6 TABLET ORAL at 21:08

## 2017-10-05 RX ADMIN — Medication 20 MILLIGRAM(S): at 17:19

## 2017-10-05 RX ADMIN — HEPARIN SODIUM 5000 UNIT(S): 5000 INJECTION INTRAVENOUS; SUBCUTANEOUS at 06:02

## 2017-10-05 RX ADMIN — CAPTOPRIL 6.25 MILLIGRAM(S): 12.5 TABLET ORAL at 21:11

## 2017-10-05 RX ADMIN — SODIUM CHLORIDE 3 MILLILITER(S): 9 INJECTION INTRAMUSCULAR; INTRAVENOUS; SUBCUTANEOUS at 06:02

## 2017-10-05 NOTE — CONSULT NOTE ADULT - SUBJECTIVE AND OBJECTIVE BOX
Behavioral Cardiology Psychological assessment     HPI:    Mrs. Quinones is a 67 year old woman with PMH of HTN, HLD, CAD hx s/p 10 PCI and breast Ca in remission presented to [facility] with substernal CP admitted for unstable angina with cardiac cath revealing 2vCAD ostial LAD and Lcx transferred to [facility] for 2vCABG course complicated by acute CP with anterolateral STEMI found to have significant occlusion of pLAD and LCX now s/p PCI, s/p IABP placement during PCI w/ removal.    Patient information:    Lives with  in [place], was visiting with son in [place] to attend family wedding.    Has 3 children and 1 grandson.   Retired in 2014, worked in corporate compliance.  Masters degree in business.            Understanding of illness and treatment recommendations:     Good understanding of current health issues "I was supposed to have bypass surgery but I had a heart attack Sunday night so they put stents in."         Psychological Assessment:    Endorsed periods of worry, sadness, and feeling more emotional in setting of current health issues and worries about future wellbeing.   Denies anhedonia.   Denies s/i.   Denies anxiety.   In past has taken Xanax when flying for anxiety.   Expressed frustration because she follows all treatment recommendations and has health lifestyle habits.   Reports she exercises daily (walks 3.5 miles/day and plays golf 3x/week), eats healthy [place]iter[place] diet, stopped smoking at age 36, sleeps 7-8hrs/night, and manages stress.   Coping strategies include: spending time with family and friends, exercise,  and "not holding things in."  Strong support system.       Substance use history:    Former tobacco use; smoked 1PPD from age 20 to 36.  Social alcohol use; glass of wine or 1 drink 2x/month.    Denies nonprescription drug use.                  MSE:    Pt seen sitting comfortably in chair.   A&Ox3.   Pleasant and cooperative.   Well related with good eye contact.   Speech normal rate and volume.  Thought process goal directed.  No evidence of psychosis, hudson, delusions.  Denies s/i.   Mood “I can’t believe this happened again.”   Affect tearful at times.   Insight and judgment good.            Impression:   Mrs. Eric is a 62 year old woman with PMH of HTN, HLD, CAD hx s/p 10 PCI and breast Ca in remission, transferred to [facility] from [facility] for CABG, course complicated by STEMI, s/p PCI with IABP.   Endorsing periods of low mood, feeling more emotional, and tearfulness.   Denies anhedonia.  Denies s/i.    Good coping strategies.   No need for medication at this time.    Receptive to support and validation.  Reports good adherence with all treatment recommendations and has healthy lifestyle habits.       Dx:   Adjustment disorder       Recommendations:     Information provided on outpatient Behavioral Cardiology services

## 2017-10-05 NOTE — CHART NOTE - NSCHARTNOTEFT_GEN_A_CORE
====================  CCU MIDNIGHT ROUNDS  ====================    ROYER MOORE  [id number]  Patient is a 67y old  Female who presents with a chief complaint of cardiac surgery (29 Sep 2017 22:45)    ====================  SUMMARY:  ====================  66 yo F HTN, HLD, CAD (PCI x 10), breast Ca, presented to  w/ UA, [facility] w/ ostial LAD and LCx dz, transferred to [facility] for CT surgery eval. Course c/b anterolateral STEMI s/p ESTELLE x 1 pLAD c/b v fib x 2 s/p defib x 2, IVP lido, IVP amio, IABP placed for coronary perfusion and transferred to CCU.  s/P IABP removal.    ====================  NEW EVENTS:  ====================  No clinical signs of fluid overload, however, c/o VIERA and chest tightness on ambulation.  Bibasal rales present on exam but comfortable on RA at rest.  Denies CP.    MEDICATIONS  (STANDING):  aspirin  chewable 81 milliGRAM(s) Oral daily  atorvastatin 80 milliGRAM(s) Oral at bedtime  captopril 6.25 milliGRAM(s) Oral every 8 hours  gabapentin 200 milliGRAM(s) Oral daily  heparin  Injectable 5000 Unit(s) SubCutaneous every 8 hours  influenza   Vaccine 0.5 milliLiter(s) IntraMuscular once  metoprolol succinate ER 50 milliGRAM(s) Oral daily  oxybutynin XL 15 milliGRAM(s) Oral at bedtime  pantoprazole    Tablet 40 milliGRAM(s) Oral before breakfast  senna 2 Tablet(s) Oral at bedtime  sodium chloride 0.9% lock flush 3 milliLiter(s) IV Push every 8 hours  ticagrelor 90 milliGRAM(s) Oral two times a day    MEDICATIONS  (PRN):  acetaminophen   Tablet. 650 milliGRAM(s) Oral every 6 hours PRN Mild Pain (1 - 3)  ALPRAZolam 0.25 milliGRAM(s) Oral three times a day PRN anxiety    ====================  VITALS (Last 12 hrs):  ====================    T(C): 37.2 (10-05-17 @ 19:15), Max: 37.8 (10-05-17 @ 16:00)  T(F): 99 (10-05-17 @ 19:15), Max: 100 (10-05-17 @ 16:00)  HR: 84 (10-05-17 @ 22:25) (74 - 92)  BP: 89/47 (10-05-17 @ 22:25) (81/46 - 120/73)  BP(mean): 61 (10-05-17 @ 22:25) (57 - 92)  ABP: --  ABP(mean): --  RR: 17 (10-05-17 @ 22:25) (17 - 20)  SpO2: 92% (10-05-17 @ 22:25) (89% - 97%)  Wt(kg): --  CVP(mm Hg): --  CVP(cm H2O): --  CO: --  CI: --  PA: --  PA(mean): --  PCWP: --  SVR: --  PVR: --    I&O's Summary    04 Oct 2017 07:01  -  05 Oct 2017 07:00  --------------------------------------------------------  IN: 1050 mL / OUT: 3000 mL / NET: -1950 mL    05 Oct 2017 07:01  -  05 Oct 2017 22:51  --------------------------------------------------------  IN: 860 mL / OUT: 1900 mL / NET: -1040 mL    ====================  NEW LABS:  ====================    10-05    138  |  101  |  20  ----------------------------<  98  4.5   |  25  |  0.76    Ca    8.9      05 Oct 2017 05:24  Phos  2.6     10-05  Mg     2.0     10-05    TPro  6.7  /  Alb  3.3  /  TBili  1.5<H>  /  DBili  x   /  AST  77<H>  /  ALT  59<H>  /  AlkPhos  94  10-05    PTT - ( 04 Oct 2017 05:57 )  PTT:34.6 sec  Creatine Kinase, Serum: 187 U/L <H> (10-05-17 @ 05:24)  Troponin T, Serum: 6.02 ng/mL <H> (10-05-17 @ 05:24)    CKMB Units: 7.6 ng/mL (10-05 @ 05:24)    ====================  PLAN:  ====================  - Continue DAPT  - Continue Toprol XL   - continue high intensity statin  - Extra Lasix IV tonight  - MOnitor strict I & O's  - Daily weights  - Monitor electrolytes; replete to keep K>4 and Mag>2  - Increase activity as tolerated    Heather Branham CCU NP  Beeper #[id number]  Spectra # [phone] ====================  CCU MIDNIGHT ROUNDS  ====================    ROYER MOORE  [id number]  Patient is a 67y old  Female who presents with a chief complaint of cardiac surgery (29 Sep 2017 22:45)    ====================  SUMMARY:  ====================  68 yo F HTN, HLD, CAD (PCI x 10), breast Ca, presented to  w/ UA, [facility] w/ ostial LAD and LCx dz, transferred to [facility] for CT surgery eval. Course c/b anterolateral STEMI s/p ESTELLE x 1 pLAD c/b v fib x 2 s/p defib x 2, IVP lido, IVP amio, IABP placed for coronary perfusion and transferred to CCU.  s/P IABP removal.    ====================  NEW EVENTS:  ====================  No clinical signs of fluid overload, however, c/o VIERA and chest tightness on ambulation.  Bibasal rales present on exam but comfortable on RA at rest.  Denies CP.    MEDICATIONS  (STANDING):  aspirin  chewable 81 milliGRAM(s) Oral daily  atorvastatin 80 milliGRAM(s) Oral at bedtime  captopril 6.25 milliGRAM(s) Oral every 8 hours  gabapentin 200 milliGRAM(s) Oral daily  heparin  Injectable 5000 Unit(s) SubCutaneous every 8 hours  influenza   Vaccine 0.5 milliLiter(s) IntraMuscular once  metoprolol succinate ER 50 milliGRAM(s) Oral daily  oxybutynin XL 15 milliGRAM(s) Oral at bedtime  pantoprazole    Tablet 40 milliGRAM(s) Oral before breakfast  senna 2 Tablet(s) Oral at bedtime  sodium chloride 0.9% lock flush 3 milliLiter(s) IV Push every 8 hours  ticagrelor 90 milliGRAM(s) Oral two times a day    MEDICATIONS  (PRN):  acetaminophen   Tablet. 650 milliGRAM(s) Oral every 6 hours PRN Mild Pain (1 - 3)  ALPRAZolam 0.25 milliGRAM(s) Oral three times a day PRN anxiety    ====================  VITALS (Last 12 hrs):  ====================    T(C): 37.2 (10-05-17 @ 19:15), Max: 37.8 (10-05-17 @ 16:00)  T(F): 99 (10-05-17 @ 19:15), Max: 100 (10-05-17 @ 16:00)  HR: 84 (10-05-17 @ 22:25) (74 - 92)  BP: 89/47 (10-05-17 @ 22:25) (81/46 - 120/73)  BP(mean): 61 (10-05-17 @ 22:25) (57 - 92)  ABP: --  ABP(mean): --  RR: 17 (10-05-17 @ 22:25) (17 - 20)  SpO2: 92% (10-05-17 @ 22:25) (89% - 97%)  Wt(kg): --  CVP(mm Hg): --  CVP(cm H2O): --  CO: --  CI: --  PA: --  PA(mean): --  PCWP: --  SVR: --  PVR: --    I&O's Summary    04 Oct 2017 07:01  -  05 Oct 2017 07:00  --------------------------------------------------------  IN: 1050 mL / OUT: 3000 mL / NET: -1950 mL    05 Oct 2017 07:01  -  05 Oct 2017 22:51  --------------------------------------------------------  IN: 860 mL / OUT: 1900 mL / NET: -1040 mL    ====================  NEW LABS:  ====================    10-05    138  |  101  |  20  ----------------------------<  98  4.5   |  25  |  0.76    Ca    8.9      05 Oct 2017 05:24  Phos  2.6     10-05  Mg     2.0     10-05    TPro  6.7  /  Alb  3.3  /  TBili  1.5<H>  /  DBili  x   /  AST  77<H>  /  ALT  59<H>  /  AlkPhos  94  10-05    PTT - ( 04 Oct 2017 05:57 )  PTT:34.6 sec  Creatine Kinase, Serum: 187 U/L <H> (10-05-17 @ 05:24)  Troponin T, Serum: 6.02 ng/mL <H> (10-05-17 @ 05:24)    CKMB Units: 7.6 ng/mL (10-05 @ 05:24)    ====================  PLAN:  ====================  - Continue DAPT  - Continue Toprol XL   - continue high intensity statin  - Extra Lasix IV tonight  - MOnitor strict I & O's  - Daily weights  - Monitor electrolytes; replete to keep K>4 and Mag>2  - Increase activity as tolerated    Heather Branham CCU NP  Beeper #[id number]  Spectra # [phone]    Fellow Addendum:   67F HTN, HLD, CAD hx with multiple PCI in the past and breast CA in remission presented to [facility] with substernal CP admitted for unstable angina with cardiac cath revealing 2vCAD ostial LAD and Lcx. pt then transferred to [facility] for 2vCABG course complicated by acute CP with anterolateral STEMI found to have significant occlusion of pLAD now s/p PCI and PCI to the LCx.    - IV Lasix 20mg given yesterday evening, monitor urine output; Will redose as needed; Net negative 1.3L output

## 2017-10-05 NOTE — PROGRESS NOTE ADULT - ASSESSMENT
67F HTN, HLD, CAD hx s/p 10 PCI and breast Ca in remission presented to [facility] with substernal CP admitted for unstable angina with cardiac cath revealing 2vCAD ostial LAD and Lcx transferred to [facility] for 2vCABG course complicated by acute CP with anterolateral STEMI found to have significant occlusion of pLAD now s/p PCI.     #Neuro - no issues   #CV - Anterolateral STEMI; [facility] demonstrated pLAD occlusion now s/p PCI--During the procedure she had 2 episodes of Vfib shocked 2x Amio 150mg x1 and Lido 100mg x1  - Trend trops and EKG - enzymes trending down.  - on DAPT, high intensity statin, restarted metoprolol.  Will start ACE once bp tolerates.  - TTE w/ EF 40% w/severely hypokinetic septum, apex, anterior wall - likely 2/2 post MI stunning No LV thrombus.    - IABP removed  - s/p lasix 20 mg PO x 2 for fluid overload yesterday   # Pulm - no issues  # Renal - had JOYCE now improving, likely 2/2 dehydration as improved after IVF  # GI - Bilirubin increased to 1.7. AST/ALT coming down, will trend, f/u fractionated bili.  C/w protonix for GERD (home med)  # ID - febrile to 100.5. F/u UA, CXR.  no clinincal signs of infection, no leukocytosis. CXR w/o PNA, c/w pulmonary edema. UA negative for infection.    # Heme - thrombocytopenia - plts stabalized. HSQ restarted. will trend thrombocytopenia, possibly 2/2 shearing from IABP.  # DVT ppx:  hsq    Victoria Wolfe PGY-2  [phone]

## 2017-10-05 NOTE — PROGRESS NOTE ADULT - SUBJECTIVE AND OBJECTIVE BOX
67F w/HTN, HLD, CAD hx s/p 10 PCI and breast Ca in remission presented to [facility] with substernal CP admitted for unstable angina with cardiac cath revealing 2vCAD ostial LAD and Lcx transferred to [facility] for 2vCABG course complicated by acute CP with anterolateral STEMI found to have significant occlusion of pLAD and LCX now s/p PCI, s/p IABP placement during PCI w/ removal.     INTERVAL HPI/OVERNIGHT EVENTS:  Patient c/o SOB yesterday night, given additional 20 mg PO lasix as had bilateral crackles.  EKG done 2/2 telemetry monitor beeping family requested EKG, had worsening depressions in II, III, AvF , patient without chest pain. Enzymes trending down.  C/o 3/10 jaw pain this am - repeat EKG w/o changes.     MEDICATIONS  (STANDING):  aspirin  chewable 81 milliGRAM(s) Oral daily  atorvastatin 80 milliGRAM(s) Oral at bedtime  furosemide    Tablet 20 milliGRAM(s) Oral daily  gabapentin 200 milliGRAM(s) Oral daily  heparin  Injectable 5000 Unit(s) SubCutaneous every 8 hours  influenza   Vaccine 0.5 milliLiter(s) IntraMuscular once  metoprolol succinate ER 50 milliGRAM(s) Oral daily  oxybutynin XL 15 milliGRAM(s) Oral at bedtime  pantoprazole    Tablet 40 milliGRAM(s) Oral before breakfast  sodium chloride 0.9% lock flush 3 milliLiter(s) IV Push every 8 hours  ticagrelor 90 milliGRAM(s) Oral two times a day    MEDICATIONS  (PRN):  acetaminophen   Tablet. 650 milliGRAM(s) Oral every 6 hours PRN Mild Pain (1 - 3)  ALPRAZolam 0.25 milliGRAM(s) Oral three times a day PRN anxiety      Allergies  No Known Allergies  Intolerances  oxycodone (Pruritus (Mild))      REVIEW OF SYSTEMS:  CONSTITUTIONAL: No fever  ENMT: +jaw pain  RESPIRATORY: No cough, wheezing, chills or hemoptysis; No shortness of breath  CARDIOVASCULAR: No chest pain, palpitations, dizziness, or leg swelling  GASTROINTESTINAL: No abdominal or epigastric pain. No nausea, vomiting, or hematemesis; No diarrhea or constipation.   GENITOURINARY: No dysuria  NEUROLOGICAL: No headache, numbness  SKIN: No rash   MUSCULOSKELETAL: No joint pain or swelling;   PSYCHIATRIC: Denies depression, anxiety      Vital Signs Last 24 Hrs  T(C): 36.8 (05 Oct 2017 01:00), Max: 37.2 (04 Oct 2017 14:00)  T(F): 98.3 (05 Oct 2017 01:00), Max: 99 (04 Oct 2017 14:00)  HR: 76 (05 Oct 2017 07:00) (68 - 100)  BP: 103/55 (05 Oct 2017 07:00) (88/50 - 119/57)  BP(mean): 72 (05 Oct 2017 07:00) (60 - 82)  RR: 17 (05 Oct 2017 07:00) (16 - 32)  SpO2: 97% (05 Oct 2017 07:00) (94% - 100%)    PHYSICAL EXAM:  GENERAL: NAD, well-groomed, well-developed  HEAD:  Atraumatic, Normocephalic  EYES: EOMI, PERRLA, conjunctiva and sclera clear  ENMT: No tonsillar erythema, exudates, or enlargement; Moist mucous membranes, Good dentition  NECK: Supple, No JVD  NERVOUS SYSTEM: AOX3, motor and sensation grossly intact in b/l UE and b/l LE  CHEST/LUNG: Clear to auscultation bilaterally; No rales, rhonchi, wheezing, or rubs  HEART: Regular rate and rhythm; No murmurs, rubs, or gallops. No LE edema  ABDOMEN: Soft, Nontender, Nondistended; Bowel sounds present  EXTREMITIES:  2+ Peripheral Pulses, No clubbing, cyanosis  LYMPH: No lymphadenopathy noted  SKIN: No rashes or lesions    LABS:                        12.0   6.6   )-----------( 134      ( 05 Oct 2017 05:24 )             35.5     05 Oct 2017 05:24    138    |  101    |  20     ----------------------------<  98     4.5     |  25     |  0.76     Ca    8.9        05 Oct 2017 05:24  Phos  2.6       05 Oct 2017 05:24  Mg     2.0       05 Oct 2017 05:24    TPro  6.7    /  Alb  3.3    /  TBili  1.5    /  DBili  x      /  AST  77     /  ALT  59     /  AlkPhos  94     05 Oct 2017 05:24    PTT - ( 04 Oct 2017 05:57 )  PTT:34.6 sec  CAPILLARY BLOOD GLUCOSE        BLOOD CULTURE    RADIOLOGY & ADDITIONAL TESTS:    Imaging Personally Reviewed:  [ ] YES     Consultant(s) Notes Reviewed:      Care Discussed with Consultants/Other Providers: 67F w/HTN, HLD, CAD hx s/p 10 PCI and breast Ca in remission presented to [facility] with substernal CP admitted for unstable angina with cardiac cath revealing 2vCAD ostial LAD and Lcx transferred to [facility] for 2vCABG course complicated by acute CP with anterolateral STEMI found to have significant occlusion of pLAD and LCX now s/p PCI, s/p IABP placement during PCI w/ removal.     INTERVAL HPI/OVERNIGHT EVENTS:  Patient c/o SOB yesterday night, given additional 20 mg PO lasix as had bilateral crackles.  EKG done 2/2 telemetry monitor beeping family requested EKG, had worsening depressions in II, III, AvF , patient without chest pain. Enzymes trending down.  C/o 3/10 jaw pain this am - repeat EKG w/o changes.     MEDICATIONS  (STANDING):  aspirin  chewable 81 milliGRAM(s) Oral daily  atorvastatin 80 milliGRAM(s) Oral at bedtime  furosemide    Tablet 20 milliGRAM(s) Oral daily  gabapentin 200 milliGRAM(s) Oral daily  heparin  Injectable 5000 Unit(s) SubCutaneous every 8 hours  influenza   Vaccine 0.5 milliLiter(s) IntraMuscular once  metoprolol succinate ER 50 milliGRAM(s) Oral daily  oxybutynin XL 15 milliGRAM(s) Oral at bedtime  pantoprazole    Tablet 40 milliGRAM(s) Oral before breakfast  sodium chloride 0.9% lock flush 3 milliLiter(s) IV Push every 8 hours  ticagrelor 90 milliGRAM(s) Oral two times a day    MEDICATIONS  (PRN):  acetaminophen   Tablet. 650 milliGRAM(s) Oral every 6 hours PRN Mild Pain (1 - 3)  ALPRAZolam 0.25 milliGRAM(s) Oral three times a day PRN anxiety      Allergies  No Known Allergies  Intolerances  oxycodone (Pruritus (Mild))      REVIEW OF SYSTEMS:  CONSTITUTIONAL: No fever  ENMT: +jaw pain  RESPIRATORY: No cough, wheezing, chills or hemoptysis; No shortness of breath  CARDIOVASCULAR: +chest pain  GASTROINTESTINAL: No abdominal or epigastric pain. No nausea, vomiting, or hematemesis; No diarrhea or constipation.   GENITOURINARY: No dysuria  NEUROLOGICAL: No headache, numbness  SKIN: No rash   MUSCULOSKELETAL: No joint pain or swelling;   PSYCHIATRIC: Denies depression, anxiety      Vital Signs Last 24 Hrs  T(C): 36.8 (05 Oct 2017 01:00), Max: 37.2 (04 Oct 2017 14:00)  T(F): 98.3 (05 Oct 2017 01:00), Max: 99 (04 Oct 2017 14:00)  HR: 76 (05 Oct 2017 07:00) (68 - 100)  BP: 103/55 (05 Oct 2017 07:00) (88/50 - 119/57)  BP(mean): 72 (05 Oct 2017 07:00) (60 - 82)  RR: 17 (05 Oct 2017 07:00) (16 - 32)  SpO2: 97% (05 Oct 2017 07:00) (94% - 100%)    PHYSICAL EXAM:  GENERAL: NAD, well-groomed, well-developed  HEAD:  Atraumatic, Normocephalic  EYES: EOMI, PERRLA, conjunctiva and sclera clear  ENMT: No tonsillar erythema, exudates, or enlargement; Moist mucous membranes, Good dentition  NECK: Supple, No JVD  NERVOUS SYSTEM: AOX3, motor and sensation grossly intact in b/l UE and b/l LE  CHEST/LUNG: Clear to auscultation bilaterally; No rales, rhonchi, wheezing, or rubs  HEART: Regular rate and rhythm; No murmurs, rubs, or gallops. No LE edema  ABDOMEN: Soft, Nontender, Nondistended; Bowel sounds present  EXTREMITIES:  2+ Peripheral Pulses, No clubbing, cyanosis  LYMPH: No lymphadenopathy noted  SKIN: No rashes or lesions    LABS:                        12.0   6.6   )-----------( 134      ( 05 Oct 2017 05:24 )             35.5     05 Oct 2017 05:24    138    |  101    |  20     ----------------------------<  98     4.5     |  25     |  0.76     Ca    8.9        05 Oct 2017 05:24  Phos  2.6       05 Oct 2017 05:24  Mg     2.0       05 Oct 2017 05:24    TPro  6.7    /  Alb  3.3    /  TBili  1.5    /  DBili  x      /  AST  77     /  ALT  59     /  AlkPhos  94     05 Oct 2017 05:24    PTT - ( 04 Oct 2017 05:57 )  PTT:34.6 sec  CAPILLARY BLOOD GLUCOSE

## 2017-10-06 ENCOUNTER — TRANSCRIPTION ENCOUNTER (OUTPATIENT)
Age: 67
End: 2017-10-06

## 2017-10-06 PROBLEM — I25.10 ATHEROSCLEROTIC HEART DISEASE OF NATIVE CORONARY ARTERY WITHOUT ANGINA PECTORIS: Chronic | Status: ACTIVE | Noted: 2017-09-29

## 2017-10-06 PROBLEM — I21.3 ST ELEVATION (STEMI) MYOCARDIAL INFARCTION OF UNSPECIFIED SITE: Chronic | Status: ACTIVE | Noted: 2017-09-29

## 2017-10-06 LAB
ALBUMIN SERPL ELPH-MCNC: 3.5 G/DL — SIGNIFICANT CHANGE UP (ref 3.3–5)
ALP SERPL-CCNC: 158 U/L — HIGH (ref 40–120)
ALT FLD-CCNC: 81 U/L RC — HIGH (ref 10–45)
ANION GAP SERPL CALC-SCNC: 14 MMOL/L — SIGNIFICANT CHANGE UP (ref 5–17)
ANION GAP SERPL CALC-SCNC: 14 MMOL/L — SIGNIFICANT CHANGE UP (ref 5–17)
APTT BLD: 38.7 SEC — HIGH (ref 27.5–37.4)
AST SERPL-CCNC: 77 U/L — HIGH (ref 10–40)
BASOPHILS # BLD AUTO: 0 K/UL — SIGNIFICANT CHANGE UP (ref 0–0.2)
BASOPHILS NFR BLD AUTO: 0.8 % — SIGNIFICANT CHANGE UP (ref 0–2)
BILIRUB SERPL-MCNC: 1.1 MG/DL — SIGNIFICANT CHANGE UP (ref 0.2–1.2)
BUN SERPL-MCNC: 22 MG/DL — SIGNIFICANT CHANGE UP (ref 7–23)
BUN SERPL-MCNC: 26 MG/DL — HIGH (ref 7–23)
CALCIUM SERPL-MCNC: 9.4 MG/DL — SIGNIFICANT CHANGE UP (ref 8.4–10.5)
CALCIUM SERPL-MCNC: 9.6 MG/DL — SIGNIFICANT CHANGE UP (ref 8.4–10.5)
CHLORIDE SERPL-SCNC: 98 MMOL/L — SIGNIFICANT CHANGE UP (ref 96–108)
CHLORIDE SERPL-SCNC: 98 MMOL/L — SIGNIFICANT CHANGE UP (ref 96–108)
CK MB BLD-MCNC: 2.7 % — SIGNIFICANT CHANGE UP (ref 0–3.5)
CK MB CFR SERPL CALC: 3.6 NG/ML — SIGNIFICANT CHANGE UP (ref 0–3.8)
CK SERPL-CCNC: 131 U/L — SIGNIFICANT CHANGE UP (ref 25–170)
CO2 SERPL-SCNC: 26 MMOL/L — SIGNIFICANT CHANGE UP (ref 22–31)
CO2 SERPL-SCNC: 27 MMOL/L — SIGNIFICANT CHANGE UP (ref 22–31)
CREAT SERPL-MCNC: 0.77 MG/DL — SIGNIFICANT CHANGE UP (ref 0.5–1.3)
CREAT SERPL-MCNC: 0.82 MG/DL — SIGNIFICANT CHANGE UP (ref 0.5–1.3)
EOSINOPHIL # BLD AUTO: 0.1 K/UL — SIGNIFICANT CHANGE UP (ref 0–0.5)
EOSINOPHIL NFR BLD AUTO: 1.8 % — SIGNIFICANT CHANGE UP (ref 0–6)
GLUCOSE SERPL-MCNC: 105 MG/DL — HIGH (ref 70–99)
GLUCOSE SERPL-MCNC: 99 MG/DL — SIGNIFICANT CHANGE UP (ref 70–99)
HCT VFR BLD CALC: 38.2 % — SIGNIFICANT CHANGE UP (ref 34.5–45)
HGB BLD-MCNC: 13 G/DL — SIGNIFICANT CHANGE UP (ref 11.5–15.5)
LYMPHOCYTES # BLD AUTO: 1.1 K/UL — SIGNIFICANT CHANGE UP (ref 1–3.3)
LYMPHOCYTES # BLD AUTO: 19.7 % — SIGNIFICANT CHANGE UP (ref 13–44)
MAGNESIUM SERPL-MCNC: 2.2 MG/DL — SIGNIFICANT CHANGE UP (ref 1.6–2.6)
MCHC RBC-ENTMCNC: 29.7 PG — SIGNIFICANT CHANGE UP (ref 27–34)
MCHC RBC-ENTMCNC: 34.1 GM/DL — SIGNIFICANT CHANGE UP (ref 32–36)
MCV RBC AUTO: 87 FL — SIGNIFICANT CHANGE UP (ref 80–100)
MONOCYTES # BLD AUTO: 1 K/UL — HIGH (ref 0–0.9)
MONOCYTES NFR BLD AUTO: 18.1 % — HIGH (ref 2–14)
NEUTROPHILS # BLD AUTO: 3.4 K/UL — SIGNIFICANT CHANGE UP (ref 1.8–7.4)
NEUTROPHILS NFR BLD AUTO: 59.6 % — SIGNIFICANT CHANGE UP (ref 43–77)
PHOSPHATE SERPL-MCNC: 2.9 MG/DL — SIGNIFICANT CHANGE UP (ref 2.5–4.5)
PLATELET # BLD AUTO: 192 K/UL — SIGNIFICANT CHANGE UP (ref 150–400)
POTASSIUM SERPL-MCNC: 3.5 MMOL/L — SIGNIFICANT CHANGE UP (ref 3.5–5.3)
POTASSIUM SERPL-MCNC: 3.9 MMOL/L — SIGNIFICANT CHANGE UP (ref 3.5–5.3)
POTASSIUM SERPL-SCNC: 3.5 MMOL/L — SIGNIFICANT CHANGE UP (ref 3.5–5.3)
POTASSIUM SERPL-SCNC: 3.9 MMOL/L — SIGNIFICANT CHANGE UP (ref 3.5–5.3)
PROT SERPL-MCNC: 7.1 G/DL — SIGNIFICANT CHANGE UP (ref 6–8.3)
RBC # BLD: 4.4 M/UL — SIGNIFICANT CHANGE UP (ref 3.8–5.2)
RBC # FLD: 13.5 % — SIGNIFICANT CHANGE UP (ref 10.3–14.5)
SODIUM SERPL-SCNC: 138 MMOL/L — SIGNIFICANT CHANGE UP (ref 135–145)
SODIUM SERPL-SCNC: 139 MMOL/L — SIGNIFICANT CHANGE UP (ref 135–145)
TROPONIN T SERPL-MCNC: 7.3 NG/ML — HIGH (ref 0–0.06)
WBC # BLD: 5.7 K/UL — SIGNIFICANT CHANGE UP (ref 3.8–10.5)
WBC # FLD AUTO: 5.7 K/UL — SIGNIFICANT CHANGE UP (ref 3.8–10.5)

## 2017-10-06 PROCEDURE — 93010 ELECTROCARDIOGRAM REPORT: CPT

## 2017-10-06 PROCEDURE — 99233 SBSQ HOSP IP/OBS HIGH 50: CPT | Mod: GC

## 2017-10-06 RX ORDER — POTASSIUM CHLORIDE 20 MEQ
20 PACKET (EA) ORAL ONCE
Qty: 0 | Refills: 0 | Status: DISCONTINUED | OUTPATIENT
Start: 2017-10-06 | End: 2017-10-06

## 2017-10-06 RX ORDER — FUROSEMIDE 40 MG
20 TABLET ORAL DAILY
Qty: 0 | Refills: 0 | Status: DISCONTINUED | OUTPATIENT
Start: 2017-10-06 | End: 2017-10-07

## 2017-10-06 RX ORDER — POTASSIUM CHLORIDE 20 MEQ
40 PACKET (EA) ORAL ONCE
Qty: 0 | Refills: 0 | Status: COMPLETED | OUTPATIENT
Start: 2017-10-06 | End: 2017-10-06

## 2017-10-06 RX ORDER — POTASSIUM CHLORIDE 20 MEQ
20 PACKET (EA) ORAL ONCE
Qty: 0 | Refills: 0 | Status: COMPLETED | OUTPATIENT
Start: 2017-10-06 | End: 2017-10-06

## 2017-10-06 RX ORDER — LISINOPRIL 2.5 MG/1
5 TABLET ORAL DAILY
Qty: 0 | Refills: 0 | Status: DISCONTINUED | OUTPATIENT
Start: 2017-10-06 | End: 2017-10-06

## 2017-10-06 RX ORDER — LISINOPRIL 2.5 MG/1
2.5 TABLET ORAL DAILY
Qty: 0 | Refills: 0 | Status: DISCONTINUED | OUTPATIENT
Start: 2017-10-07 | End: 2017-10-07

## 2017-10-06 RX ADMIN — HEPARIN SODIUM 5000 UNIT(S): 5000 INJECTION INTRAVENOUS; SUBCUTANEOUS at 21:06

## 2017-10-06 RX ADMIN — Medication 20 MILLIGRAM(S): at 11:49

## 2017-10-06 RX ADMIN — Medication 100 MILLIGRAM(S): at 16:33

## 2017-10-06 RX ADMIN — HEPARIN SODIUM 5000 UNIT(S): 5000 INJECTION INTRAVENOUS; SUBCUTANEOUS at 05:07

## 2017-10-06 RX ADMIN — Medication 50 MILLIGRAM(S): at 05:09

## 2017-10-06 RX ADMIN — PANTOPRAZOLE SODIUM 40 MILLIGRAM(S): 20 TABLET, DELAYED RELEASE ORAL at 06:02

## 2017-10-06 RX ADMIN — CAPTOPRIL 6.25 MILLIGRAM(S): 12.5 TABLET ORAL at 05:08

## 2017-10-06 RX ADMIN — Medication 40 MILLIEQUIVALENT(S): at 09:24

## 2017-10-06 RX ADMIN — LISINOPRIL 5 MILLIGRAM(S): 2.5 TABLET ORAL at 13:00

## 2017-10-06 RX ADMIN — Medication 100 MILLIGRAM(S): at 23:14

## 2017-10-06 RX ADMIN — ATORVASTATIN CALCIUM 80 MILLIGRAM(S): 80 TABLET, FILM COATED ORAL at 21:06

## 2017-10-06 RX ADMIN — HEPARIN SODIUM 5000 UNIT(S): 5000 INJECTION INTRAVENOUS; SUBCUTANEOUS at 14:49

## 2017-10-06 RX ADMIN — SODIUM CHLORIDE 3 MILLILITER(S): 9 INJECTION INTRAMUSCULAR; INTRAVENOUS; SUBCUTANEOUS at 21:07

## 2017-10-06 RX ADMIN — TICAGRELOR 90 MILLIGRAM(S): 90 TABLET ORAL at 05:08

## 2017-10-06 RX ADMIN — SODIUM CHLORIDE 3 MILLILITER(S): 9 INJECTION INTRAMUSCULAR; INTRAVENOUS; SUBCUTANEOUS at 13:02

## 2017-10-06 RX ADMIN — Medication 650 MILLIGRAM(S): at 06:05

## 2017-10-06 RX ADMIN — TICAGRELOR 90 MILLIGRAM(S): 90 TABLET ORAL at 17:37

## 2017-10-06 RX ADMIN — Medication 15 MILLIGRAM(S): at 21:25

## 2017-10-06 RX ADMIN — Medication 650 MILLIGRAM(S): at 05:09

## 2017-10-06 RX ADMIN — Medication 81 MILLIGRAM(S): at 11:49

## 2017-10-06 RX ADMIN — SODIUM CHLORIDE 3 MILLILITER(S): 9 INJECTION INTRAMUSCULAR; INTRAVENOUS; SUBCUTANEOUS at 05:09

## 2017-10-06 RX ADMIN — Medication 20 MILLIEQUIVALENT(S): at 16:15

## 2017-10-06 NOTE — DISCHARGE NOTE ADULT - MEDICATION SUMMARY - MEDICATIONS TO STOP TAKING
I will STOP taking the medications listed below when I get home from the hospital:    rosuvastatin 40 mg oral tablet  -- 1 tab(s) by mouth once a day (at bedtime)    Metoprolol Succinate  mg oral tablet, extended release  -- 1 tab(s) by mouth once a day (at bedtime)    dilTIAZem 120 mg/24 hours oral capsule, extended release  -- 1 cap(s) by mouth once a day (at bedtime)

## 2017-10-06 NOTE — DISCHARGE NOTE ADULT - ADDITIONAL INSTRUCTIONS
Follow up with Dr. Ring within 1-2 weeks.  Phone number listed above. Follow up with Dr. Ring within 7-10 days.  Phone number listed above.

## 2017-10-06 NOTE — CHART NOTE - NSCHARTNOTEFT_GEN_A_CORE
====================  CCU MIDNIGHT ROUNDS  ====================    ROYER MOORE  [id number]  Patient is a 67y old  Female who presents with a chief complaint of STEMI (06 Oct 2017 12:40)    ===================  SUMMARY:  ====================  66 yo F HTN, HLD, CAD (PCI x 10), breast Ca, presented to  w/ UA, [facility] w/ ostial LAD and LCx dz, transferred to [facility] for CT surgery eval. Course c/b anterolateral STEMI s/p ESTELLE x 1 pLAD c/b v fib x 2 s/p defib x 2, IVP lido, IVP amio, IABP placed for coronary perfusion and transferred to CCU.  s/P IABP removal.    ====================  NEW EVENTS:  ====================  Now on life vest.  No arrhythmias.  Denies CP.  Borderline hypotension but asymptomatic.  comfortable on RA.    MEDICATIONS  (STANDING):  aspirin  chewable 81 milliGRAM(s) Oral daily  atorvastatin 80 milliGRAM(s) Oral at bedtime  furosemide    Tablet 20 milliGRAM(s) Oral daily  heparin  Injectable 5000 Unit(s) SubCutaneous every 8 hours  influenza   Vaccine 0.5 milliLiter(s) IntraMuscular once  metoprolol succinate ER 50 milliGRAM(s) Oral daily  oxybutynin XL 15 milliGRAM(s) Oral at bedtime  pantoprazole    Tablet 40 milliGRAM(s) Oral before breakfast  senna 2 Tablet(s) Oral at bedtime  sodium chloride 0.9% lock flush 3 milliLiter(s) IV Push every 8 hours  ticagrelor 90 milliGRAM(s) Oral two times a day    MEDICATIONS  (PRN):  acetaminophen   Tablet. 650 milliGRAM(s) Oral every 6 hours PRN Mild Pain (1 - 3)  ALPRAZolam 0.25 milliGRAM(s) Oral three times a day PRN anxiety  guaiFENesin    Syrup 100 milliGRAM(s) Oral every 6 hours PRN Cough    ====================  VITALS (Last 12 hrs):  ====================    T(C): 37.2 (10-06-17 @ 19:25), Max: 37.3 (10-06-17 @ 16:00)  T(F): 98.9 (10-06-17 @ 19:25), Max: 99.1 (10-06-17 @ 16:00)  HR: 78 (10-06-17 @ 21:17) (66 - 86)  BP: 93/51 (10-06-17 @ 21:17) (92/50 - 106/59)  BP(mean): 64 (10-06-17 @ 21:17) (63 - 81)  ABP: --  ABP(mean): --  RR: 16 (10-06-17 @ 21:17) (14 - 18)  SpO2: --  Wt(kg): --  CVP(mm Hg): --  CVP(cm H2O): --  CO: --  CI: --  PA: --  PA(mean): --  PCWP: --  SVR: --  PVR: --    I&O's Summary    05 Oct 2017 07:01  -  06 Oct 2017 07:00  --------------------------------------------------------  IN: 1160 mL / OUT: 2400 mL / NET: -1240 mL    06 Oct 2017 07:01  -  06 Oct 2017 22:26  --------------------------------------------------------  IN: 580 mL / OUT: 1050 mL / NET: -470 mL    ====================  NEW LABS:  ====================    10-06    139  |  98  |  26<H>  ----------------------------<  105<H>  3.9   |  27  |  0.82    Ca    9.6      06 Oct 2017 15:10  Phos  2.9     10-06  Mg     2.2     10-06    TPro  7.1  /  Alb  3.5  /  TBili  1.1  /  DBili  x   /  AST  77<H>  /  ALT  81<H>  /  AlkPhos  158<H>  10-06    PTT - ( 06 Oct 2017 05:41 )  PTT:38.7 sec  Creatine Kinase, Serum: 131 U/L (10-06-17 @ 05:41)  Troponin T, Serum: 7.30 ng/mL <H> (10-06-17 @ 05:41)    CKMB Units: 3.6 ng/mL (10-06 @ 05:41)    ====================  PLAN:  ====================  - Continue DAPT  - Continue Toprol XL   - Continue high intensity statin  - Continue Lasix  - Monitor strict I & O's  - Daily weights  - Monitor electrolytes; replete to keep K>4 and Mag>2  - Increase activity as tolerated  - Lifevest on  - For discharge in am    Heather Branham CCU NP  Beeper #[id number]  Spectra # [phone]

## 2017-10-06 NOTE — PROVIDER CONTACT NOTE (OTHER) - BACKGROUND
patient admitted from [facility] post cardiac-cath
s/p MI, CHF, ischemic cardiomyopathy
transfer from [facility] on 9/29 with  MVD for surgery on monwas taking cardizem and metroprolol at home

## 2017-10-06 NOTE — DISCHARGE NOTE ADULT - MEDICATION SUMMARY - MEDICATIONS TO TAKE
I will START or STAY ON the medications listed below when I get home from the hospital:    aspirin 81 mg oral tablet, chewable  -- 1 tab(s) by mouth once a day  -- Indication: For STEMI involving left anterior descending coronary artery    losartan 25 mg oral tablet  -- 0.5 tab(s) by mouth once a day  -- Indication: For STEMI involving left anterior descending coronary artery    nitroglycerin 0.4 mg sublingual tablet  -- 1 tab(s) under tongue every 5 minutes, As Needed  -- Indication: For CAD (coronary artery disease)    Neurontin 100 mg oral capsule  -- 200 milligram(s) by mouth once a day, As Needed  -- Indication: For Pain    atorvastatin 80 mg oral tablet  -- 1 tab(s) by mouth once a day (at bedtime)  -- Indication: For STEMI involving left anterior descending coronary artery    ticagrelor 90 mg oral tablet  -- 1 tab(s) by mouth 2 times a day  -- Indication: For STEMI involving left anterior descending coronary artery    metoprolol succinate 50 mg oral tablet, extended release  -- 1 tab(s) by mouth once a day  -- Indication: For STEMI involving left anterior descending coronary artery    furosemide 20 mg oral tablet  -- 1 tab(s) by mouth once a day  -- Indication: For Heart Failure     oxybutynin 15 mg/24 hr oral tablet, extended release  -- 1 tab(s) by mouth once a day (at bedtime)  -- Indication: For Urinary frequency    multivitamin with minerals  -- 1  by mouth once a day (at bedtime)  -- Indication: For dietary supplement

## 2017-10-06 NOTE — DISCHARGE NOTE ADULT - PLAN OF CARE
to improve your heart function Please follow up with Dr. Ring within 7-10 days.  Phone number listed above.  Please take your medications as prescribed. Please take your medications as prescribed and follow up with Dr. Ring within 7-10 days. Please follow up with Dr. Ring within 7-10 days.  Phone number listed above.  Please take your medications as prescribed.  You should ambulate as much as possible and rest when you become tired. Exercise will strengthen your heart muscle so that blood can pump throughout your body. Limit any strenuous exercise or heavy lifting as your heart muscle is still recovering from experiencing a heart attack.  Ask your cardiologist if it is safe to return to other activity you may want to pursue during your first follow up visit.  You may call [facility] if you have any questions/concerns regarding your procedure [phone].

## 2017-10-06 NOTE — DISCHARGE NOTE ADULT - PATIENT PORTAL LINK FT
“You can access the Follow[facility] Patient Portal, offered by [facility], by registering with the following website: [email or web address]”

## 2017-10-06 NOTE — CONSULT NOTE ADULT - SUBJECTIVE AND OBJECTIVE BOX
Behavioral Cardiology Psychological assessment     HPI:    Mr. Danielson is a 50 year old man with no significant PMH who initially presented to [facility] s/p V-fib arrest, found to have AWSTEMI, transferred to [facility] s/p PCI w/ DESx1 to pLAD (100%) s/p IABP insertion, admitted to CCU for further management; IABP removed today.      Patient information:    Lives with his wife, son (age 21) and stepson (age 10).   Works for [employer], does physical work.              Understanding of illness and treatment recommendations:     Very good understanding of cardiac diagnosis and treatment plan.   Understands need to take medication and follow up with cardiologist.            Psychological Assessment:    Endorsed periods of  feeling more emotional with tearfulness in setting of current health issues and worries about future wellbeing.   Denies anhedonia.   Denies s/i.   Denies anxiety.      Substance use history:    Never smoked.   Denies nonprescription drug use.    Social alcohol use (1-2 drinks when socializing 2-3x/month).         MSE:    Pt seen lying flat in bed (post IABP removal).   A&Ox3.    Cooperative;  well related with good eye contact.   Speech normal rate and volume.  Thought process goal directed.  No evidence of psychosis, hudson, delusions.  Denies s/i.   Mood "I can’t believe what happened to me."   Affect tearful at times.   Insight and judgment good.            Impression:       Dx:   Adjustment disorder       Recommendations:     Information provided on outpatient Behavioral Cardiology services

## 2017-10-06 NOTE — PROVIDER CONTACT NOTE (OTHER) - RECOMMENDATIONS
will check meds
Will follow up.
consider robitussin, pt on lisinopril ace-inhibitor, changed fro  captopril to lisinopril today

## 2017-10-06 NOTE — PROGRESS NOTE ADULT - SUBJECTIVE AND OBJECTIVE BOX
CCU fellow addendum:   Assessment:  67F HTN, HLD, CAD hx with multiple PCI in the past and breast CA in remission presented to [facility] with substernal CP admitted for unstable angina with cardiac cath revealing 2vCAD ostial LAD and Lcx. pt then transferred to [facility] for 2vCABG course complicated by acute CP with anterolateral STEMI found to have significant occlusion of pLAD now s/p PCI and PCI to the LCx.    Interval events:  Pt had an TTE  10/5 which showed EF of 30-35% with significant area of akinesis. Discussed results with patient and talked about possible LifeVest. Patient is interested in wearing a Lifevest.   Plan  #STEMI  - continue DAPT - continue statin  - continue metoprolol succinate  - switch to Lisiniopril  - monitor symptoms, EKG, trend daily  -dose Lasix 20 mg daily PO for acute pulm edema   #primary prevention - started on medical therapy  - ok to contact [employer] to talk to patient about fitting for Life Vest    #JOYCE- suspect prerenal, in the setting of poor PO intake - resolved   #heme - low plts - can restart HSQ   #GI - encourage pt to take PO intake  - antiemetic meds  #neuro- headaches, - likely in the setting of poor PO intake and dehydration - resolved  - tylenol PRN    # ppx- HSQ   #dispo- post cath day 5, continue to monitor in the unit given acute pulm edema, can likely d./c home tomorrow after LifeVest fitting

## 2017-10-06 NOTE — PROGRESS NOTE ADULT - ATTENDING COMMENTS
Patient is seen and examined with fellow, NP and the CCU house-staff. I agree with the history, physical and the assessment and plan.  change the captopril to lisinopril  c/w toprol  TTE results reviewed  evaluation for the Life Vest pending

## 2017-10-06 NOTE — PROGRESS NOTE ADULT - SUBJECTIVE AND OBJECTIVE BOX
Patient is a 67y old  Female who presents with a chief complaint of cardiac surgery (29 Sep 2017 22:45)       INTERVAL HPI/OVERNIGHT EVENTS:    MEDICATIONS  (STANDING):  aspirin  chewable 81 milliGRAM(s) Oral daily  atorvastatin 80 milliGRAM(s) Oral at bedtime  captopril 6.25 milliGRAM(s) Oral every 8 hours  gabapentin 200 milliGRAM(s) Oral daily  heparin  Injectable 5000 Unit(s) SubCutaneous every 8 hours  influenza   Vaccine 0.5 milliLiter(s) IntraMuscular once  metoprolol succinate ER 50 milliGRAM(s) Oral daily  oxybutynin XL 15 milliGRAM(s) Oral at bedtime  pantoprazole    Tablet 40 milliGRAM(s) Oral before breakfast  potassium chloride   Solution 40 milliEquivalent(s) Oral once  senna 2 Tablet(s) Oral at bedtime  sodium chloride 0.9% lock flush 3 milliLiter(s) IV Push every 8 hours  ticagrelor 90 milliGRAM(s) Oral two times a day    MEDICATIONS  (PRN):  acetaminophen   Tablet. 650 milliGRAM(s) Oral every 6 hours PRN Mild Pain (1 - 3)  ALPRAZolam 0.25 milliGRAM(s) Oral three times a day PRN anxiety      Allergies    No Known Allergies    Intolerances    oxycodone (Pruritus (Mild))      REVIEW OF SYSTEMS:  CONSTITUTIONAL: No fever, weight loss, or fatigue  EYES: No eye pain, visual disturbances, or discharge  ENMT:  No difficulty hearing, tinnitus, vertigo; No sinus or throat pain  RESPIRATORY: No cough, wheezing, chills or hemoptysis; No shortness of breath  CARDIOVASCULAR: No chest pain, palpitations, dizziness, or leg swelling  GASTROINTESTINAL: No abdominal or epigastric pain. No nausea, vomiting, or hematemesis; No diarrhea or constipation. No melena or hematochezia.  GENITOURINARY: No dysuria, frequency, hematuria, or incontinence  NEUROLOGICAL: No headaches, loss of strength, numbness, or tremors  SKIN: No itching, burning, rashes, or lesions   LYMPH NODES: No enlarged glands  ENDOCRINE: No heat or cold intolerance; No polydipsia or polyuria  MUSCULOSKELETAL: No joint pain or swelling;   PSYCHIATRIC: Denies depression, anxiety  HEME/LYMPH: No easy bruising, or bleeding gums  ALLERGY AND IMMUNOLOGIC: No hives or eczema    Vital Signs Last 24 Hrs  T(C): 37.5 (06 Oct 2017 06:15), Max: 37.8 (05 Oct 2017 16:00)  T(F): 99.5 (06 Oct 2017 06:15), Max: 100 (05 Oct 2017 16:00)  HR: 74 (06 Oct 2017 06:15) (70 - 92)  BP: 92/54 (06 Oct 2017 06:15) (81/46 - 120/73)  BP(mean): 62 (06 Oct 2017 06:15) (57 - 92)  RR: 16 (06 Oct 2017 06:15) (15 - 22)  SpO2: 95% (06 Oct 2017 06:15) (89% - 98%)    PHYSICAL EXAM:  GENERAL: NAD, well-groomed, well-developed  HEAD:  Atraumatic, Normocephalic  EYES: EOMI, PERRLA, conjunctiva and sclera clear  ENMT: No tonsillar erythema, exudates, or enlargement; Moist mucous membranes, Good dentition  NECK: Supple, No JVD  NERVOUS SYSTEM: AOX3, motor and sensation grossly intact in b/l UE and b/l LE  CHEST/LUNG: Clear to auscultation bilaterally; No rales, rhonchi, wheezing, or rubs  HEART: Regular rate and rhythm; No murmurs, rubs, or gallops. No LE edema  ABDOMEN: Soft, Nontender, Nondistended; Bowel sounds present  EXTREMITIES:  2+ Peripheral Pulses, No clubbing, cyanosis  LYMPH: No lymphadenopathy noted  SKIN: No rashes or lesions    LABS:                        13.0   5.7   )-----------( 192      ( 06 Oct 2017 05:41 )             38.2     06 Oct 2017 05:41    138    |  98     |  22     ----------------------------<  99     3.5     |  26     |  0.77     Ca    9.4        06 Oct 2017 05:41  Phos  2.9       06 Oct 2017 05:41  Mg     2.2       06 Oct 2017 05:41    TPro  7.1    /  Alb  3.5    /  TBili  1.1    /  DBili  x      /  AST  77     /  ALT  81     /  AlkPhos  158    06 Oct 2017 05:41    PTT - ( 06 Oct 2017 05:41 )  PTT:38.7 sec  CAPILLARY BLOOD GLUCOSE        BLOOD CULTURE    RADIOLOGY & ADDITIONAL TESTS:    Imaging Personally Reviewed:  [ ] YES     Consultant(s) Notes Reviewed:      Care Discussed with Consultants/Other Providers: 67F w/HTN, HLD, CAD hx s/p 10 PCI and breast Ca in remission presented to [facility] with substernal CP admitted for unstable angina with cardiac cath revealing 2vCAD ostial LAD and Lcx transferred to [facility] for 2vCABG course complicated by acute CP with anterolateral STEMI found to have significant occlusion of pLAD and LCX now s/p PCI, s/p IABP placement during PCI w/ removal.       INTERVAL HPI/OVERNIGHT EVENTS:  Had SOB yesterday evening with bibasilar crackles, dosed lasix 20 mg PO x 1.  Denies chest pain, SOB.      MEDICATIONS  (STANDING):  aspirin  chewable 81 milliGRAM(s) Oral daily  atorvastatin 80 milliGRAM(s) Oral at bedtime  captopril 6.25 milliGRAM(s) Oral every 8 hours  gabapentin 200 milliGRAM(s) Oral daily  heparin  Injectable 5000 Unit(s) SubCutaneous every 8 hours  influenza   Vaccine 0.5 milliLiter(s) IntraMuscular once  metoprolol succinate ER 50 milliGRAM(s) Oral daily  oxybutynin XL 15 milliGRAM(s) Oral at bedtime  pantoprazole    Tablet 40 milliGRAM(s) Oral before breakfast  potassium chloride   Solution 40 milliEquivalent(s) Oral once  senna 2 Tablet(s) Oral at bedtime  sodium chloride 0.9% lock flush 3 milliLiter(s) IV Push every 8 hours  ticagrelor 90 milliGRAM(s) Oral two times a day    MEDICATIONS  (PRN):  acetaminophen   Tablet. 650 milliGRAM(s) Oral every 6 hours PRN Mild Pain (1 - 3)  ALPRAZolam 0.25 milliGRAM(s) Oral three times a day PRN anxiety      Allergies    No Known Allergies    Intolerances    oxycodone (Pruritus (Mild))      REVIEW OF SYSTEMS:  CONSTITUTIONAL: No fever  ENMT: No sinus or throat pain  RESPIRATORY: No sob, no cough  CARDIOVASCULAR: No chest pain, palpitations, dizziness, or leg swelling  GASTROINTESTINAL: No abdominal or epigastric pain. No nausea, vomiting, or hematemesis  GENITOURINARY: No dysuria, frequency, hematuria, or incontinence  NEUROLOGICAL: No headaches, no weakness   SKIN: No itching, burning, rashes, or lesions   MUSCULOSKELETAL: No joint pain or swelling;   PSYCHIATRIC: Denies depression, anxiety      Vital Signs Last 24 Hrs  T(C): 37.5 (06 Oct 2017 06:15), Max: 37.8 (05 Oct 2017 16:00)  T(F): 99.5 (06 Oct 2017 06:15), Max: 100 (05 Oct 2017 16:00)  HR: 74 (06 Oct 2017 06:15) (70 - 92)  BP: 92/54 (06 Oct 2017 06:15) (81/46 - 120/73)  BP(mean): 62 (06 Oct 2017 06:15) (57 - 92)  RR: 16 (06 Oct 2017 06:15) (15 - 22)  SpO2: 95% (06 Oct 2017 06:15) (89% - 98%)    PHYSICAL EXAM:  GENERAL: NAD  HEAD:  Atraumatic, Normocephalic  EYES: PERRLA  ENMT: MMM  NECK: Supple, No JVD  NERVOUS SYSTEM: AOX3, no focal neuro defect  CHEST/LUNG: Clear to auscultation bilaterally, no wheeze   HEART: Regular rate and rhythm; No murmurs, rubs, or gallops. No LE edema  ABDOMEN: soft/nt/nd, +BS  EXTREMITIES:  2+ Peripheral Pulses, skin warm/dry  SKIN: No rashes or lesions    LABS:                        13.0   5.7   )-----------( 192      ( 06 Oct 2017 05:41 )             38.2     06 Oct 2017 05:41    138    |  98     |  22     ----------------------------<  99     3.5     |  26     |  0.77     Ca    9.4        06 Oct 2017 05:41  Phos  2.9       06 Oct 2017 05:41  Mg     2.2       06 Oct 2017 05:41    TPro  7.1    /  Alb  3.5    /  TBili  1.1    /  DBili  x      /  AST  77     /  ALT  81     /  AlkPhos  158    06 Oct 2017 05:41    PTT - ( 06 Oct 2017 05:41 )  PTT:38.7 sec  CAPILLARY BLOOD GLUCOSE

## 2017-10-06 NOTE — DISCHARGE NOTE ADULT - CARE PROVIDER_API CALL
No Ring), Cardiovascular Disease; Internal Medicine; Nuclear Cardiology  [address] [place] [address]  Phone: [phone]  Fax: [phone]

## 2017-10-06 NOTE — DISCHARGE NOTE ADULT - HOSPITAL COURSE
67F PMH CAD s/p ~ 10 coronary stents (2005 and ~ 2011), prior MI, HTN, hyperlipidemia, Breast CA (1993) s/p left mastectomy and chemo, former tobacco use presented to ER via ambulance with c/o chest pain.   Patient prompted spouse to call EMS after experienced moderately severe discomfort in a band-like distribution across her chest with radiation to the right aspect of her jaw -- shared some characteristics of her chronic stable angina but occurred at rest and did not improve with 1 SL NTG which is usual.  Typically her angina is prompted by exertion and relieved quickly with rest.  Chest pain ultimately resolved following a 2nd NTG given to her by EMS.    She lives in [place] and has been visiting family in [place] since returning from a  vacation 1 week ago.    She underwent cardiac cath at [facility] which revealed 2 Vessel CAD with ostial LAD and LCX disease (instent restenosis) and was transferred to [facility] for further management.     Patient was accepted by CTS awaiting CABG scheduled for the next morning when patient c/o sudden 7/10 CP a/w ST elevations. Cardio called for IABP placement in cath lab. in cath lab, pLAD was found to be 100% occluded and needed a 1 ESTELLE. PCI was c/b 2 episodes of Vfib s/p defib x2 lido 100, amio 150. Patient transferred to CCU for further management.     Patient initially had persistent chest pain and jaw pain after cath however cardiac enzymes trended down.  Her chest pain then resolved.   The IABP was removed.  Repeat TTE done showed EF 35-40% w/ akinetic mid to apical inferoseptum, anteroseptum and anterior walls, as well as apical segments.  No LV thrombus seen.  She was started on DAPT w/ asa, brilinta, she was originally started on captopril 6.25 mg tid 2/2 hypotension, was later changed to lisinopril 5 mg daily.  She was started on toprol xl 50 mg daily.  Her blood pressures remained soft but had MAP>65.  She had shortness of breath with bibasilar crackles so was started on lasix 20 mg PO daily.  EP was consulted for life vest evaluation given her EF of 35%, will repeat TTE in 3 months to evaluate need for AICD.    She initially had JOYCE which improved, Cr 0.77.  Additionally she had thrombocytopenia with elevated indirect bilirubin, thought to be 2/2 sheer stress from IABP, bilirubin trended down and platelets stabalized.      Patient was discharged 10/7/17 in stable condition.  She will f/u with cardiologist in 7-10 days. She was instructed to not travel back to [facility] (where she lives) for 30 days so will f/u with Dr. Ring in the meantime. 67F PMH CAD s/p ~ 10 coronary stents (2005 and ~ 2011), prior MI, HTN, hyperlipidemia, Breast CA (1993) s/p left mastectomy and chemo, former tobacco use presented to ER via ambulance with c/o chest pain.   Patient prompted spouse to call EMS after experienced moderately severe discomfort in a band-like distribution across her chest with radiation to the right aspect of her jaw -- shared some characteristics of her chronic stable angina but occurred at rest and did not improve with 1 SL NTG which is usual.  Typically her angina is prompted by exertion and relieved quickly with rest.  Chest pain ultimately resolved following a 2nd NTG given to her by EMS.    She lives in [place] and has been visiting family in [place] since returning from a  vacation 1 week ago.    She underwent cardiac cath at [facility] which revealed 2 Vessel CAD with ostial LAD and LCX disease (instent restenosis) and was transferred to [facility] for further management.     Patient was accepted by CTS awaiting CABG scheduled for the next morning when patient c/o sudden 7/10 CP a/w ST elevations. Cardio called for IABP placement in cath lab. in cath lab, pLAD was found to be 100% occluded and needed a 1 ESTELLE. PCI was c/b 2 episodes of Vfib s/p defib x2 lido 100, amio 150. Patient transferred to CCU for further management.     Patient initially had persistent chest pain and jaw pain after cath however cardiac enzymes trended down.  Her chest pain then resolved.   The IABP was removed.  Repeat TTE done showed EF 35-40% w/ akinetic mid to apical inferoseptum, anteroseptum and anterior walls, as well as apical segments.  No LV thrombus seen.  She was started on DAPT w/ asa, brilinta, she was originally started on captopril 6.25 mg tid 2/2 hypotension, was later changed to lisinopril 5 mg daily.  She was started on toprol xl 50 mg daily.  Her blood pressures remained soft but had MAP>65.  She had shortness of breath with bibasilar crackles so was started on lasix 20 mg PO daily.  EP was consulted for life vest evaluation given her EF of 35%, will repeat TTE in 3 months to evaluate need for AICD.    She initially had JOYCE which improved, Cr 0.77.  Additionally she had thrombocytopenia with elevated indirect bilirubin, thought to be 2/2 sheer stress from IABP, bilirubin trended down and platelets stabalized.      Patient was discharged 10/7/17 in stable condition.  She will f/u with cardiologist in 7-10 days. She was instructed to not travel back to [facility] (where she lives) for 30 days so will f/u with Dr. Ring in the meantime. 67F PMH CAD s/p ~ 10 coronary stents (2005 and ~ 2011), prior MI, HTN, hyperlipidemia, Breast CA (1993) s/p left mastectomy and chemo, former tobacco use presented to ER via ambulance with c/o chest pain.   Patient prompted spouse to call EMS after experienced moderately severe discomfort in a band-like distribution across her chest with radiation to the right aspect of her jaw -- shared some characteristics of her chronic stable angina but occurred at rest and did not improve with 1 SL NTG which is usual.  Typically her angina is prompted by exertion and relieved quickly with rest.  Chest pain ultimately resolved following a 2nd NTG given to her by EMS.    She lives in [place] and has been visiting family in [place] since returning from a  vacation 1 week ago.    She underwent cardiac cath at [facility] which revealed 2 Vessel CAD with ostial LAD and LCX disease (instent restenosis) and was transferred to [facility] for further management.     Patient was accepted by CTS awaiting CABG scheduled for the next morning when patient c/o sudden 7/10 CP a/w ST elevations. Cardio called for IABP placement in cath lab. in cath lab, pLAD was found to be 100% occluded and needed a 1 ESTELLE. PCI was c/b 2 episodes of Vfib s/p defib x2 lido 100, amio 150. Patient transferred to CCU for further management.     Patient initially had persistent chest pain and jaw pain after cath however cardiac enzymes trended down.  Her chest pain then resolved.   The IABP was removed.  Repeat TTE done showed EF 35-40% w/ akinetic mid to apical inferoseptum, anteroseptum and anterior walls, as well as apical segments.  No LV thrombus seen.  She was started on DAPT w/ asa, brilinta, she was originally started on captopril 6.25 mg tid 2/2 hypotension, was later changed to lisinopril 5 mg daily.  She was started on toprol xl 50 mg daily.  Her blood pressures remained soft but had MAP>65.  She had shortness of breath with bibasilar crackles so was started on lasix 20 mg PO daily.  EP was consulted for life vest evaluation given her EF of 35%, will repeat TTE in 3 months to evaluate need for AICD.    She initially had JOYCE which improved, Cr 0.77.  Additionally she had thrombocytopenia with elevated indirect bilirubin, thought to be 2/2 sheer stress from IABP, bilirubin trended down and platelets stabalized.       She developed a dry cough after starting lisinopril, was changed to losartan.      Patient was discharged 10/7/17 in stable condition.  She will f/u with cardiologist in 7-10 days. She was instructed to not travel back to [place] (where she lives) for 30 days so will f/u with Dr. Ring in the meantime.

## 2017-10-06 NOTE — DISCHARGE NOTE ADULT - CARE PLAN
Principal Discharge DX:	STEMI involving left anterior descending coronary artery  Goal:	to improve your heart function  Instructions for follow-up, activity and diet:	Please follow up with Dr. Ring within 7-10 days.  Phone number listed above.  Please take your medications as prescribed.  Secondary Diagnosis:	CAD (coronary artery disease)  Instructions for follow-up, activity and diet:	Please take your medications as prescribed and follow up with Dr. Ring within 7-10 days. Principal Discharge DX:	STEMI involving left anterior descending coronary artery  Goal:	to improve your heart function  Instructions for follow-up, activity and diet:	Please follow up with Dr. Ring within 7-10 days.  Phone number listed above.  Please take your medications as prescribed.  You should ambulate as much as possible and rest when you become tired. Exercise will strengthen your heart muscle so that blood can pump throughout your body. Limit any strenuous exercise or heavy lifting as your heart muscle is still recovering from experiencing a heart attack.  Ask your cardiologist if it is safe to return to other activity you may want to pursue during your first follow up visit.  You may call [facility] if you have any questions/concerns regarding your procedure [phone].  Secondary Diagnosis:	CAD (coronary artery disease)  Instructions for follow-up, activity and diet:	Please take your medications as prescribed and follow up with Dr. Ring within 7-10 days.

## 2017-10-06 NOTE — PROVIDER CONTACT NOTE (OTHER) - ASSESSMENT
oral temperature 99.1 F, SR HR 74, BP 94/54 MAP 65, pt denies pain, pt denies respiratory distress
patient stable, SB on cardiac monitor, denies any discomfort, Patient stated she had metoprolol 100mg po at [facility] before she was transferred to [facility]. Patient on heparin infusion.
pt noted anxious no other complaints

## 2017-10-06 NOTE — PROGRESS NOTE ADULT - ASSESSMENT
67F HTN, HLD, CAD hx s/p 10 PCI and breast Ca in remission presented to [facility] with substernal CP admitted for unstable angina with cardiac cath revealing 2vCAD ostial LAD and Lcx transferred to [facility] for 2vCABG course complicated by acute CP with anterolateral STEMI found to have significant occlusion of pLAD now s/p PCI.     #Neuro - no issues   #CV - Anterolateral STEMI; [facility] demonstrated pLAD occlusion now s/p PCI--During the procedure she had 2 episodes of Vfib shocked 2x Amio 150mg x1 and Lido 100mg x1  - Trend trops and EKG - enzymes trending down.  - on DAPT, high intensity statin, restarted metoprolol.  Will start ACE once bp tolerates.  - TTE w/ EF 40% w/severely hypokinetic septum, apex, anterior wall - likely 2/2 post MI stunning No LV thrombus.    - IABP removed  - s/p lasix 20 mg PO x 2 for fluid overload yesterday   # Pulm - no issues  # Renal - had JOYCE now improving, likely 2/2 dehydration as improved after IVF  # GI - Bilirubin increased to 1.7. AST/ALT coming down, will trend, f/u fractionated bili.  C/w protonix for GERD (home med)  # ID - febrile to 100.5. F/u UA, CXR.  no clinincal signs of infection, no leukocytosis. CXR w/o PNA, c/w pulmonary edema. UA negative for infection.    # Heme - thrombocytopenia - plts stabalized. HSQ restarted. will trend thrombocytopenia, possibly 2/2 shearing from IABP.  # DVT ppx:  hsq    Victoria Wolfe PGY-2  [phone] 67F HTN, HLD, CAD hx s/p 10 PCI and breast Ca in remission presented to [facility] with substernal CP admitted for unstable angina with cardiac cath revealing 2vCAD ostial LAD and Lcx transferred to [facility] for 2vCABG course complicated by acute CP with anterolateral STEMI found to have significant occlusion of pLAD now s/p PCI.     #Neuro - no issues   #CV - Anterolateral STEMI; [facility] demonstrated pLAD occlusion now s/p PCI--During the procedure she had 2 episodes of Vfib shocked 2x Amio 150mg x1 and Lido 100mg x1, had IABP which was removed 3 days ago.  - enzymes trending down  - on DAPT, high intensity statin, toprol 50 xl qd, captopril 6.25 tid.  - repeat TTE done yesterday similar to prior w/ EF 35-40%. - akinetic mid to apical inferoseptum, anteroseptum and anterior walls and the remaining apical segments are akinetic. No LV thrombus seen.  # Pulm - no issues  # Renal - had JOYCE now improving, likely 2/2 dehydration as improved after IVF  # GI - Bilirubin trending down, likely 2/2 hemolysis from hemolysis, IABP shearing.  Tolerating diet.  # ID - no issues, fevers resolved    # Heme - thrombocytopenia - plts stabalized. HSQ restarted. will trend thrombocytopenia, possibly 2/2 shearing from IABP.  # DVT ppx:  hsq    Victoria Wolfe PGY-2  [phone] 67F HTN, HLD, CAD hx s/p 10 PCI and breast Ca in remission presented to [facility] with substernal CP admitted for unstable angina with cardiac cath revealing 2vCAD ostial LAD and Lcx transferred to [facility] for 2vCABG course complicated by acute CP with anterolateral STEMI found to have significant occlusion of pLAD now s/p PCI.     #Neuro - no issues   #CV - Anterolateral STEMI; [facility] demonstrated pLAD occlusion now s/p PCI--During the procedure she had 2 episodes of Vfib shocked 2x Amio 150mg x1 and Lido 100mg x1, had IABP which was removed 3 days ago.  - enzymes trending down  - on DAPT, high intensity statin, toprol 50 xl qd, captopril 6.25 tid.  - repeat TTE done yesterday similar to prior w/ EF 35-40%. - akinetic mid to apical inferoseptum, anteroseptum and anterior walls and the remaining apical segments are akinetic. No LV thrombus seen.  Will start daily lasix 20 mg PO.    - given EF 35-40% on most recent TTE - will have EP eval for possible live vest, repeat TTE in 90 days to decide on AICD placement after three months of optimal medical therapy.    # Pulm - no issues  # Renal - had JOYCE now improved.  # GI - Bilirubin trending down, likely 2/2 hemolysis from IABP.  Tolerating diet.  # ID - no issues, fevers resolved    # Heme - thrombocytopenia - plts stabalized. HSQ restarted.  Likely 2/2 shearing from IABP.  # DVT ppx:  hsq    Victoria Wolfe PGY-2  [phone] 67F HTN, HLD, CAD hx s/p 10 PCI and breast Ca in remission presented to [facility] with substernal CP admitted for unstable angina with cardiac cath revealing 2vCAD ostial LAD and Lcx transferred to [facility] for 2vCABG course complicated by acute CP with anterolateral STEMI found to have significant occlusion of pLAD now s/p PCI.     #Neuro - no issues   #CV - Anterolateral STEMI; [facility] demonstrated pLAD occlusion now s/p PCI--During the procedure she had 2 episodes of Vfib shocked 2x Amio 150mg x1 and Lido 100mg x1, had IABP which was removed 3 days ago.  - enzymes trending down  - on DAPT, high intensity statin, toprol 50 xl qd, will start lisinopril 5 mg daily.  - repeat TTE done yesterday similar to prior w/ EF 35-40%. - akinetic mid to apical inferoseptum, anteroseptum and anterior walls and the remaining apical segments are akinetic. No LV thrombus seen.  Will start daily lasix 20 mg PO.    - given EF 35-40% on most recent TTE - will have EP eval for possible live vest, repeat TTE in 90 days to decide on AICD placement after three months of optimal medical therapy.    # Pulm - no issues  # Renal - had JOYCE now improved.  # GI - Bilirubin trending down, likely 2/2 hemolysis from IABP.  Tolerating diet.  # ID - no issues, fevers resolved    # Heme - thrombocytopenia - plts stabalized. HSQ restarted.  Likely 2/2 shearing from IABP.  # DVT ppx:  hsq    Victoria Wolfe PGY-2  [phone]

## 2017-10-07 VITALS
HEART RATE: 77 BPM | SYSTOLIC BLOOD PRESSURE: 100 MMHG | DIASTOLIC BLOOD PRESSURE: 55 MMHG | RESPIRATION RATE: 18 BRPM | TEMPERATURE: 99 F

## 2017-10-07 LAB
ALBUMIN SERPL ELPH-MCNC: 3.5 G/DL — SIGNIFICANT CHANGE UP (ref 3.3–5)
ALP SERPL-CCNC: 171 U/L — HIGH (ref 40–120)
ALT FLD-CCNC: 115 U/L RC — HIGH (ref 10–45)
ANION GAP SERPL CALC-SCNC: 12 MMOL/L — SIGNIFICANT CHANGE UP (ref 5–17)
APTT BLD: 41.4 SEC — HIGH (ref 27.5–37.4)
AST SERPL-CCNC: 86 U/L — HIGH (ref 10–40)
BILIRUB SERPL-MCNC: 0.8 MG/DL — SIGNIFICANT CHANGE UP (ref 0.2–1.2)
BUN SERPL-MCNC: 20 MG/DL — SIGNIFICANT CHANGE UP (ref 7–23)
CALCIUM SERPL-MCNC: 9.7 MG/DL — SIGNIFICANT CHANGE UP (ref 8.4–10.5)
CHLORIDE SERPL-SCNC: 99 MMOL/L — SIGNIFICANT CHANGE UP (ref 96–108)
CO2 SERPL-SCNC: 25 MMOL/L — SIGNIFICANT CHANGE UP (ref 22–31)
CREAT SERPL-MCNC: 0.77 MG/DL — SIGNIFICANT CHANGE UP (ref 0.5–1.3)
GLUCOSE SERPL-MCNC: 98 MG/DL — SIGNIFICANT CHANGE UP (ref 70–99)
HCT VFR BLD CALC: 36.9 % — SIGNIFICANT CHANGE UP (ref 34.5–45)
HGB BLD-MCNC: 12.5 G/DL — SIGNIFICANT CHANGE UP (ref 11.5–15.5)
MAGNESIUM SERPL-MCNC: 2.2 MG/DL — SIGNIFICANT CHANGE UP (ref 1.6–2.6)
MCHC RBC-ENTMCNC: 29.4 PG — SIGNIFICANT CHANGE UP (ref 27–34)
MCHC RBC-ENTMCNC: 33.7 GM/DL — SIGNIFICANT CHANGE UP (ref 32–36)
MCV RBC AUTO: 87.3 FL — SIGNIFICANT CHANGE UP (ref 80–100)
PHOSPHATE SERPL-MCNC: 3.3 MG/DL — SIGNIFICANT CHANGE UP (ref 2.5–4.5)
PLATELET # BLD AUTO: 223 K/UL — SIGNIFICANT CHANGE UP (ref 150–400)
POTASSIUM SERPL-MCNC: 4.4 MMOL/L — SIGNIFICANT CHANGE UP (ref 3.5–5.3)
POTASSIUM SERPL-SCNC: 4.4 MMOL/L — SIGNIFICANT CHANGE UP (ref 3.5–5.3)
PROT SERPL-MCNC: 7.1 G/DL — SIGNIFICANT CHANGE UP (ref 6–8.3)
RBC # BLD: 4.23 M/UL — SIGNIFICANT CHANGE UP (ref 3.8–5.2)
RBC # FLD: 13.3 % — SIGNIFICANT CHANGE UP (ref 10.3–14.5)
SODIUM SERPL-SCNC: 136 MMOL/L — SIGNIFICANT CHANGE UP (ref 135–145)
WBC # BLD: 5.9 K/UL — SIGNIFICANT CHANGE UP (ref 3.8–10.5)
WBC # FLD AUTO: 5.9 K/UL — SIGNIFICANT CHANGE UP (ref 3.8–10.5)

## 2017-10-07 PROCEDURE — C8924: CPT

## 2017-10-07 PROCEDURE — C9600: CPT | Mod: LC

## 2017-10-07 PROCEDURE — 82947 ASSAY GLUCOSE BLOOD QUANT: CPT

## 2017-10-07 PROCEDURE — 84132 ASSAY OF SERUM POTASSIUM: CPT

## 2017-10-07 PROCEDURE — 92960 CARDIOVERSION ELECTRIC EXT: CPT

## 2017-10-07 PROCEDURE — 85014 HEMATOCRIT: CPT

## 2017-10-07 PROCEDURE — 84443 ASSAY THYROID STIM HORMONE: CPT

## 2017-10-07 PROCEDURE — 93306 TTE W/DOPPLER COMPLETE: CPT

## 2017-10-07 PROCEDURE — 83010 ASSAY OF HAPTOGLOBIN QUANT: CPT

## 2017-10-07 PROCEDURE — 99233 SBSQ HOSP IP/OBS HIGH 50: CPT

## 2017-10-07 PROCEDURE — 85730 THROMBOPLASTIN TIME PARTIAL: CPT

## 2017-10-07 PROCEDURE — 82553 CREATINE MB FRACTION: CPT

## 2017-10-07 PROCEDURE — 86901 BLOOD TYPING SEROLOGIC RH(D): CPT

## 2017-10-07 PROCEDURE — C1889: CPT

## 2017-10-07 PROCEDURE — 83036 HEMOGLOBIN GLYCOSYLATED A1C: CPT

## 2017-10-07 PROCEDURE — 82248 BILIRUBIN DIRECT: CPT

## 2017-10-07 PROCEDURE — 85576 BLOOD PLATELET AGGREGATION: CPT

## 2017-10-07 PROCEDURE — C9606: CPT | Mod: LD

## 2017-10-07 PROCEDURE — 83605 ASSAY OF LACTIC ACID: CPT

## 2017-10-07 PROCEDURE — 80061 LIPID PANEL: CPT

## 2017-10-07 PROCEDURE — 86900 BLOOD TYPING SEROLOGIC ABO: CPT

## 2017-10-07 PROCEDURE — 84295 ASSAY OF SERUM SODIUM: CPT

## 2017-10-07 PROCEDURE — 82435 ASSAY OF BLOOD CHLORIDE: CPT

## 2017-10-07 PROCEDURE — 85027 COMPLETE CBC AUTOMATED: CPT

## 2017-10-07 PROCEDURE — C1769: CPT

## 2017-10-07 PROCEDURE — 90686 IIV4 VACC NO PRSV 0.5 ML IM: CPT

## 2017-10-07 PROCEDURE — 33967 INSERT I-AORT PERCUT DEVICE: CPT

## 2017-10-07 PROCEDURE — 82330 ASSAY OF CALCIUM: CPT

## 2017-10-07 PROCEDURE — 93321 DOPPLER ECHO F-UP/LMTD STD: CPT

## 2017-10-07 PROCEDURE — 80048 BASIC METABOLIC PNL TOTAL CA: CPT

## 2017-10-07 PROCEDURE — 87640 STAPH A DNA AMP PROBE: CPT

## 2017-10-07 PROCEDURE — 84100 ASSAY OF PHOSPHORUS: CPT

## 2017-10-07 PROCEDURE — 71045 X-RAY EXAM CHEST 1 VIEW: CPT

## 2017-10-07 PROCEDURE — C1894: CPT

## 2017-10-07 PROCEDURE — 85045 AUTOMATED RETICULOCYTE COUNT: CPT

## 2017-10-07 PROCEDURE — 86850 RBC ANTIBODY SCREEN: CPT

## 2017-10-07 PROCEDURE — C1887: CPT

## 2017-10-07 PROCEDURE — C1725: CPT

## 2017-10-07 PROCEDURE — 84484 ASSAY OF TROPONIN QUANT: CPT

## 2017-10-07 PROCEDURE — C8929: CPT

## 2017-10-07 PROCEDURE — 87641 MR-STAPH DNA AMP PROBE: CPT

## 2017-10-07 PROCEDURE — 99152 MOD SED SAME PHYS/QHP 5/>YRS: CPT

## 2017-10-07 PROCEDURE — 93005 ELECTROCARDIOGRAM TRACING: CPT

## 2017-10-07 PROCEDURE — 80053 COMPREHEN METABOLIC PANEL: CPT

## 2017-10-07 PROCEDURE — 93458 L HRT ARTERY/VENTRICLE ANGIO: CPT | Mod: 59

## 2017-10-07 PROCEDURE — 82803 BLOOD GASES ANY COMBINATION: CPT

## 2017-10-07 PROCEDURE — 83735 ASSAY OF MAGNESIUM: CPT

## 2017-10-07 PROCEDURE — 99153 MOD SED SAME PHYS/QHP EA: CPT

## 2017-10-07 PROCEDURE — C1874: CPT

## 2017-10-07 PROCEDURE — 93880 EXTRACRANIAL BILAT STUDY: CPT

## 2017-10-07 PROCEDURE — 81001 URINALYSIS AUTO W/SCOPE: CPT

## 2017-10-07 PROCEDURE — 85610 PROTHROMBIN TIME: CPT

## 2017-10-07 PROCEDURE — 83880 ASSAY OF NATRIURETIC PEPTIDE: CPT

## 2017-10-07 PROCEDURE — 82550 ASSAY OF CK (CPK): CPT

## 2017-10-07 RX ORDER — TICAGRELOR 90 MG/1
1 TABLET ORAL
Qty: 60 | Refills: 0 | OUTPATIENT
Start: 2017-10-07 | End: 2017-11-06

## 2017-10-07 RX ORDER — ROSUVASTATIN CALCIUM 5 MG/1
1 TABLET ORAL
Qty: 0 | Refills: 0 | COMMUNITY

## 2017-10-07 RX ORDER — METOPROLOL TARTRATE 50 MG
1 TABLET ORAL
Qty: 0 | Refills: 0 | COMMUNITY

## 2017-10-07 RX ORDER — FUROSEMIDE 40 MG
1 TABLET ORAL
Qty: 30 | Refills: 0 | OUTPATIENT
Start: 2017-10-07 | End: 2017-11-06

## 2017-10-07 RX ORDER — LOSARTAN POTASSIUM 100 MG/1
0.5 TABLET, FILM COATED ORAL
Qty: 15 | Refills: 0 | OUTPATIENT
Start: 2017-10-07 | End: 2017-11-06

## 2017-10-07 RX ORDER — ASPIRIN/CALCIUM CARB/MAGNESIUM 324 MG
1 TABLET ORAL
Qty: 0 | Refills: 0 | COMMUNITY

## 2017-10-07 RX ORDER — ATORVASTATIN CALCIUM 80 MG/1
1 TABLET, FILM COATED ORAL
Qty: 30 | Refills: 0 | OUTPATIENT
Start: 2017-10-07 | End: 2017-11-06

## 2017-10-07 RX ORDER — LOSARTAN POTASSIUM 100 MG/1
12.5 TABLET, FILM COATED ORAL DAILY
Qty: 0 | Refills: 0 | Status: DISCONTINUED | OUTPATIENT
Start: 2017-10-07 | End: 2017-10-07

## 2017-10-07 RX ORDER — METOPROLOL TARTRATE 50 MG
1 TABLET ORAL
Qty: 30 | Refills: 0 | OUTPATIENT
Start: 2017-10-07 | End: 2017-11-06

## 2017-10-07 RX ORDER — ASPIRIN/CALCIUM CARB/MAGNESIUM 324 MG
1 TABLET ORAL
Qty: 30 | Refills: 0 | OUTPATIENT
Start: 2017-10-07 | End: 2017-11-06

## 2017-10-07 RX ORDER — DILTIAZEM HCL 120 MG
1 CAPSULE, EXT RELEASE 24 HR ORAL
Qty: 0 | Refills: 0 | COMMUNITY

## 2017-10-07 RX ADMIN — TICAGRELOR 90 MILLIGRAM(S): 90 TABLET ORAL at 05:13

## 2017-10-07 RX ADMIN — PANTOPRAZOLE SODIUM 40 MILLIGRAM(S): 20 TABLET, DELAYED RELEASE ORAL at 06:08

## 2017-10-07 RX ADMIN — Medication 50 MILLIGRAM(S): at 05:13

## 2017-10-07 RX ADMIN — LISINOPRIL 2.5 MILLIGRAM(S): 2.5 TABLET ORAL at 06:08

## 2017-10-07 RX ADMIN — Medication 20 MILLIGRAM(S): at 06:08

## 2017-10-07 RX ADMIN — INFLUENZA VIRUS VACCINE 0.5 MILLILITER(S): 15; 15; 15; 15 SUSPENSION INTRAMUSCULAR at 12:52

## 2017-10-07 RX ADMIN — Medication 81 MILLIGRAM(S): at 12:53

## 2017-10-07 RX ADMIN — HEPARIN SODIUM 5000 UNIT(S): 5000 INJECTION INTRAVENOUS; SUBCUTANEOUS at 05:12

## 2017-10-07 RX ADMIN — SODIUM CHLORIDE 3 MILLILITER(S): 9 INJECTION INTRAMUSCULAR; INTRAVENOUS; SUBCUTANEOUS at 05:13

## 2017-10-07 NOTE — PROGRESS NOTE ADULT - ASSESSMENT
67F HTN, HLD, CAD hx s/p 10 PCI and breast Ca in remission presented to [facility] with substernal CP admitted for unstable angina with cardiac cath revealing 2vCAD ostial LAD and Lcx transferred to [facility] for 2vCABG course complicated by acute CP with anterolateral STEMI found to have significant occlusion of pLAD now s/p PCI.     #Neuro - no issues   #CV - Anterolateral STEMI; [facility] demonstrated pLAD occlusion now s/p PCI--During the procedure she had 2 episodes of Vfib shocked 2x Amio 150mg x1 and Lido 100mg x1, had IABP which was removed 3 days ago.  - enzymes trending down  - on DAPT, high intensity statin, toprol 50 xl qd, lisinopril 2.5 mg daily.  - repeat TTE done yesterday similar to prior w/ EF 35-40%. - akinetic mid to apical inferoseptum, anteroseptum and anterior walls and the remaining apical segments are akinetic. No LV thrombus seen.  Will start daily lasix 20 mg PO.    - given EF 35-40% on most recent TTE - will have EP eval for possible live vest, repeat TTE in 90 days to decide on AICD placement after three months of optimal medical therapy.    # Pulm - no issues  # Renal - had JOYCE now improved.  # GI - Bilirubin trending down, likely 2/2 hemolysis from IABP.  Tolerating diet.  # ID - no issues, fevers resolved    # Heme - thrombocytopenia - plts stabalized. HSQ restarted.  Likely 2/2 shearing from IABP.  # DVT ppx:  hsq    Victoria Wolfe PGY-2  [phone] 67F HTN, HLD, CAD hx s/p 10 PCI and breast Ca in remission presented to [facility] with substernal CP admitted for unstable angina with cardiac cath revealing 2vCAD ostial LAD and Lcx transferred to [facility] for 2vCABG course complicated by acute CP with anterolateral STEMI found to have significant occlusion of pLAD now s/p PCI.     #Neuro - no issues   #CV - Anterolateral STEMI; [facility] demonstrated pLAD occlusion now s/p PCI--During the procedure she had 2 episodes of Vfib shocked 2x Amio 150mg x1 and Lido 100mg x1, had IABP which was removed 3 days ago.  - enzymes trending down  - on DAPT, high intensity statin, toprol 50 xl qd, lisinopril changed to losartan given new dry cough.    - repeat TTE done yesterday similar to prior w/ EF 35-40%. - akinetic mid to apical inferoseptum, anteroseptum and anterior walls and the remaining apical segments are akinetic. No LV thrombus seen.  Will start daily lasix 20 mg PO.    - given EF 35-40% on most recent TTE - live vest placed yesterday.  # Pulm - no issues  # Renal - had JOYCE now improved.  # GI - Bilirubin trending down, likely 2/2 hemolysis from IABP.  Tolerating diet.  # ID - no issues, fevers resolved    # Heme - thrombocytopenia - plts stabalized. HSQ restarted.  Likely 2/2 shearing from IABP.  # DVT ppx:  hsq  # dispo - dc home today w/ f/u with Dr. Ring.    Victoria Wolfe PGY-2  [phone]

## 2017-10-07 NOTE — PROGRESS NOTE ADULT - SUBJECTIVE AND OBJECTIVE BOX
67F w/HTN, HLD, CAD hx s/p 10 PCI and breast Ca in remission presented to [facility] with substernal CP admitted for unstable angina with cardiac cath revealing 2vCAD ostial LAD and Lcx transferred to [facility] for 2vCABG course complicated by acute CP with anterolateral STEMI found to have significant occlusion of pLAD and LCX now s/p PCI, s/p IABP placement during PCI w/ removal.     INTERVAL HPI/OVERNIGHT EVENTS:     MEDICATIONS  (STANDING):  aspirin  chewable 81 milliGRAM(s) Oral daily  atorvastatin 80 milliGRAM(s) Oral at bedtime  furosemide    Tablet 20 milliGRAM(s) Oral daily  heparin  Injectable 5000 Unit(s) SubCutaneous every 8 hours  influenza   Vaccine 0.5 milliLiter(s) IntraMuscular once  lisinopril 2.5 milliGRAM(s) Oral daily  metoprolol succinate ER 50 milliGRAM(s) Oral daily  oxybutynin XL 15 milliGRAM(s) Oral at bedtime  pantoprazole    Tablet 40 milliGRAM(s) Oral before breakfast  senna 2 Tablet(s) Oral at bedtime  sodium chloride 0.9% lock flush 3 milliLiter(s) IV Push every 8 hours  ticagrelor 90 milliGRAM(s) Oral two times a day    MEDICATIONS  (PRN):  acetaminophen   Tablet. 650 milliGRAM(s) Oral every 6 hours PRN Mild Pain (1 - 3)  ALPRAZolam 0.25 milliGRAM(s) Oral three times a day PRN anxiety  guaiFENesin    Syrup 100 milliGRAM(s) Oral every 6 hours PRN Cough      Allergies    No Known Allergies    Intolerances    oxycodone (Pruritus (Mild))      REVIEW OF SYSTEMS:  CONSTITUTIONAL: No fever  ENMT:   No sinus or throat pain  RESPIRATORY: No cough, wheezing, chills or hemoptysis; No shortness of breath  CARDIOVASCULAR: No chest pain, palpitations, dizziness  GASTROINTESTINAL: No abdominal or epigastric pain. No nausea, vomiting, or hematemesis; No diarrhea or constipation. No melena or hematochezia.  GENITOURINARY: No dysuria  NEUROLOGICAL: No headaches, numbness, weakness   SKIN: No itching, burning, rashes, or lesions   MUSCULOSKELETAL: No joint pain or swelling;   PSYCHIATRIC: Denies depression, anxiety    Vital Signs Last 24 Hrs  T(C): 36.7 (07 Oct 2017 05:17), Max: 37.3 (06 Oct 2017 16:00)  T(F): 98 (07 Oct 2017 05:17), Max: 99.1 (06 Oct 2017 16:00)  HR: 70 (07 Oct 2017 06:10) (66 - 92)  BP: 104/65 (07 Oct 2017 06:10) (84/53 - 118/70)  BP(mean): 76 (07 Oct 2017 06:10) (63 - 86)  RR: 18 (07 Oct 2017 06:10) (14 - 18)  SpO2: 95% (06 Oct 2017 08:31) (95% - 95%)    PHYSICAL EXAM:  GENERAL: NAD  HEAD:  Atraumatic  EYES: PERRLA  ENMT: MMM  NECK: Supple, No JVD  NERVOUS SYSTEM: AOX3, motor and sensation grossly intact in b/l UE and b/l LE  CHEST/LUNG: Clear to auscultation bilaterally; No rales, rhonchi, wheezing, or rubs  HEART: Regular rate and rhythm; No murmurs, rubs, or gallops. No LE edema  ABDOMEN: Soft, Nontender, Nondistended; Bowel sounds present      LABS:                        12.5   5.9   )-----------( 223      ( 07 Oct 2017 05:43 )             36.9     07 Oct 2017 05:43    136    |  99     |  20     ----------------------------<  98     4.4     |  25     |  0.77     Ca    9.7        07 Oct 2017 05:43  Phos  3.3       07 Oct 2017 05:43  Mg     2.2       07 Oct 2017 05:43    TPro  7.1    /  Alb  3.5    /  TBili  0.8    /  DBili  x      /  AST  86     /  ALT  115    /  AlkPhos  171    07 Oct 2017 05:43    PTT - ( 07 Oct 2017 05:43 )  PTT:41.4 sec  CAPILLARY BLOOD GLUCOSE 67F w/HTN, HLD, CAD hx s/p 10 PCI and breast Ca in remission presented to [facility] with substernal CP admitted for unstable angina with cardiac cath revealing 2vCAD ostial LAD and Lcx transferred to [facility] for 2vCABG course complicated by acute CP with anterolateral STEMI found to have significant occlusion of pLAD and LCX now s/p PCI, s/p IABP placement during PCI w/ removal.     INTERVAL HPI/OVERNIGHT EVENTS:  No events overnight. Has persistent dry cough.  ambulating.    MEDICATIONS  (STANDING):  aspirin  chewable 81 milliGRAM(s) Oral daily  atorvastatin 80 milliGRAM(s) Oral at bedtime  furosemide    Tablet 20 milliGRAM(s) Oral daily  heparin  Injectable 5000 Unit(s) SubCutaneous every 8 hours  influenza   Vaccine 0.5 milliLiter(s) IntraMuscular once  lisinopril 2.5 milliGRAM(s) Oral daily  metoprolol succinate ER 50 milliGRAM(s) Oral daily  oxybutynin XL 15 milliGRAM(s) Oral at bedtime  pantoprazole    Tablet 40 milliGRAM(s) Oral before breakfast  senna 2 Tablet(s) Oral at bedtime  sodium chloride 0.9% lock flush 3 milliLiter(s) IV Push every 8 hours  ticagrelor 90 milliGRAM(s) Oral two times a day    MEDICATIONS  (PRN):  acetaminophen   Tablet. 650 milliGRAM(s) Oral every 6 hours PRN Mild Pain (1 - 3)  ALPRAZolam 0.25 milliGRAM(s) Oral three times a day PRN anxiety  guaiFENesin    Syrup 100 milliGRAM(s) Oral every 6 hours PRN Cough      Allergies    No Known Allergies    Intolerances    oxycodone (Pruritus (Mild))      REVIEW OF SYSTEMS:  CONSTITUTIONAL: No fever  ENMT:   No sinus or throat pain  RESPIRATORY: +cough. No shortness of breath  CARDIOVASCULAR: No chest pain, palpitations, dizziness  GASTROINTESTINAL: No abdominal or epigastric pain. No nausea, vomiting, or hematemesis; No diarrhea or constipation. No melena or hematochezia.  GENITOURINARY: No dysuria  NEUROLOGICAL: No headaches, numbness, weakness   SKIN: No itching, burning, rashes, or lesions   MUSCULOSKELETAL: No joint pain or swelling;   PSYCHIATRIC: Denies depression, anxiety    Vital Signs Last 24 Hrs  T(C): 36.7 (07 Oct 2017 05:17), Max: 37.3 (06 Oct 2017 16:00)  T(F): 98 (07 Oct 2017 05:17), Max: 99.1 (06 Oct 2017 16:00)  HR: 70 (07 Oct 2017 06:10) (66 - 92)  BP: 104/65 (07 Oct 2017 06:10) (84/53 - 118/70)  BP(mean): 76 (07 Oct 2017 06:10) (63 - 86)  RR: 18 (07 Oct 2017 06:10) (14 - 18)  SpO2: 95% (06 Oct 2017 08:31) (95% - 95%)    PHYSICAL EXAM:  GENERAL: NAD  HEAD:  Atraumatic  EYES: PERRLA  ENMT: MMM  NECK: Supple, No JVD  NERVOUS SYSTEM: AOX3, motor and sensation grossly intact in b/l UE and b/l LE  CHEST/LUNG:+bibasilar crackles.  HEART: Regular rate and rhythm; No murmurs, rubs, or gallops. No LE edema  ABDOMEN: Soft, Nontender, Nondistended; Bowel sounds present      LABS:                        12.5   5.9   )-----------( 223      ( 07 Oct 2017 05:43 )             36.9     07 Oct 2017 05:43    136    |  99     |  20     ----------------------------<  98     4.4     |  25     |  0.77     Ca    9.7        07 Oct 2017 05:43  Phos  3.3       07 Oct 2017 05:43  Mg     2.2       07 Oct 2017 05:43    TPro  7.1    /  Alb  3.5    /  TBili  0.8    /  DBili  x      /  AST  86     /  ALT  115    /  AlkPhos  171    07 Oct 2017 05:43    PTT - ( 07 Oct 2017 05:43 )  PTT:41.4 sec  CAPILLARY BLOOD GLUCOSE

## 2017-10-07 NOTE — PROGRESS NOTE ADULT - ATTENDING COMMENTS
Patient status post PCI for anterior wall STEMI.  She has multiple PCI over many years. Would continue dual antiplatelet therapy with ASA and ticagrelor.  Continue high intensity statin.  Continue beta-blocker and change from ACEi to ARB for patient who has developed a dry cough.  LifeVest for primary prevention of sudden cardiac death.    Follow-up with No Ring as outpatient prior to eventual return to [place].

## 2017-10-19 ENCOUNTER — NON-APPOINTMENT (OUTPATIENT)
Age: 67
End: 2017-10-19

## 2017-10-19 ENCOUNTER — RX RENEWAL (OUTPATIENT)
Age: 67
End: 2017-10-19

## 2017-10-19 ENCOUNTER — APPOINTMENT (OUTPATIENT)
Dept: CARDIOLOGY | Facility: CLINIC | Age: 67
End: 2017-10-19
Payer: MEDICARE

## 2017-10-19 VITALS
OXYGEN SATURATION: 99 % | SYSTOLIC BLOOD PRESSURE: 120 MMHG | HEIGHT: 64 IN | BODY MASS INDEX: 24.75 KG/M2 | DIASTOLIC BLOOD PRESSURE: 77 MMHG | WEIGHT: 145 LBS | HEART RATE: 56 BPM

## 2017-10-19 DIAGNOSIS — I10 ESSENTIAL (PRIMARY) HYPERTENSION: ICD-10-CM

## 2017-10-19 DIAGNOSIS — E78.00 PURE HYPERCHOLESTEROLEMIA, UNSPECIFIED: ICD-10-CM

## 2017-10-19 DIAGNOSIS — Z78.9 OTHER SPECIFIED HEALTH STATUS: ICD-10-CM

## 2017-10-19 DIAGNOSIS — Z85.3 PERSONAL HISTORY OF MALIGNANT NEOPLASM OF BREAST: ICD-10-CM

## 2017-10-19 DIAGNOSIS — Z87.891 PERSONAL HISTORY OF NICOTINE DEPENDENCE: ICD-10-CM

## 2017-10-19 DIAGNOSIS — I50.9 HEART FAILURE, UNSPECIFIED: ICD-10-CM

## 2017-10-19 DIAGNOSIS — I25.10 ATHEROSCLEROTIC HEART DISEASE OF NATIVE CORONARY ARTERY W/OUT ANGINA PECTORIS: ICD-10-CM

## 2017-10-19 DIAGNOSIS — I21.02 ST ELEVATION (STEMI) MYOCARDIAL INFARCTION INVOLVING LEFT ANTERIOR DESCENDING CORONARY ARTERY: ICD-10-CM

## 2017-10-19 DIAGNOSIS — R35.0 FREQUENCY OF MICTURITION: ICD-10-CM

## 2017-10-19 DIAGNOSIS — Z82.0 FAMILY HISTORY OF EPILEPSY AND OTHER DISEASES OF THE NERVOUS SYSTEM: ICD-10-CM

## 2017-10-19 DIAGNOSIS — Z80.3 FAMILY HISTORY OF MALIGNANT NEOPLASM OF BREAST: ICD-10-CM

## 2017-10-19 PROCEDURE — 99215 OFFICE O/P EST HI 40 MIN: CPT

## 2017-10-19 PROCEDURE — 93000 ELECTROCARDIOGRAM COMPLETE: CPT

## 2017-10-19 RX ORDER — LOSARTAN POTASSIUM 25 MG/1
25 TABLET, FILM COATED ORAL DAILY
Qty: 180 | Refills: 3 | Status: ACTIVE | COMMUNITY
Start: 1900-01-01 | End: 1900-01-01

## 2017-10-19 RX ORDER — ATORVASTATIN CALCIUM 80 MG/1
80 TABLET, FILM COATED ORAL
Qty: 30 | Refills: 11 | Status: ACTIVE | COMMUNITY
Start: 1900-01-01 | End: 1900-01-01

## 2017-10-19 RX ORDER — METOPROLOL SUCCINATE 50 MG/1
50 TABLET, EXTENDED RELEASE ORAL
Qty: 90 | Refills: 3 | Status: ACTIVE | COMMUNITY
Start: 1900-01-01 | End: 1900-01-01

## 2017-10-19 RX ORDER — ASPIRIN 81 MG
81 TABLET, DELAYED RELEASE (ENTERIC COATED) ORAL
Refills: 0 | Status: ACTIVE | COMMUNITY

## 2017-10-19 RX ORDER — NITROGLYCERIN 0.4 MG/1
0.4 TABLET SUBLINGUAL
Qty: 25 | Refills: 0 | Status: ACTIVE | COMMUNITY
Start: 2017-07-06

## 2017-10-19 RX ORDER — OXYBUTYNIN CHLORIDE 15 MG/1
15 TABLET, EXTENDED RELEASE ORAL
Qty: 30 | Refills: 0 | Status: ACTIVE | COMMUNITY
Start: 2016-10-27

## 2017-10-19 RX ORDER — MULTIVITAMIN
TABLET ORAL
Refills: 0 | Status: ACTIVE | COMMUNITY

## 2017-10-19 RX ORDER — SPIRONOLACTONE 25 MG/1
25 TABLET ORAL DAILY
Qty: 90 | Refills: 3 | Status: ACTIVE | COMMUNITY
Start: 2017-10-19 | End: 1900-01-01

## 2017-10-19 RX ORDER — FUROSEMIDE 20 MG/1
20 TABLET ORAL DAILY
Qty: 90 | Refills: 3 | Status: ACTIVE | COMMUNITY
Start: 1900-01-01 | End: 1900-01-01

## 2017-10-19 RX ORDER — GABAPENTIN 100 MG/1
100 CAPSULE ORAL DAILY
Refills: 0 | Status: ACTIVE | COMMUNITY

## 2017-10-28 LAB
ALBUMIN SERPL ELPH-MCNC: 4.6 G/DL
ALP BLD-CCNC: 67 U/L
ALT SERPL-CCNC: 31 U/L
ANION GAP SERPL CALC-SCNC: 15 MMOL/L
AST SERPL-CCNC: 26 U/L
BILIRUB SERPL-MCNC: 0.6 MG/DL
BUN SERPL-MCNC: 20 MG/DL
CALCIUM SERPL-MCNC: 10.7 MG/DL
CHLORIDE SERPL-SCNC: 96 MMOL/L
CO2 SERPL-SCNC: 28 MMOL/L
CREAT SERPL-MCNC: 0.88 MG/DL
GLUCOSE SERPL-MCNC: 99 MG/DL
POTASSIUM SERPL-SCNC: 4.1 MMOL/L
PROT SERPL-MCNC: 8.3 G/DL
SODIUM SERPL-SCNC: 139 MMOL/L

## 2018-01-25 ENCOUNTER — RX RENEWAL (OUTPATIENT)
Age: 68
End: 2018-01-25

## 2018-01-25 RX ORDER — OXYBUTYNIN CHLORIDE 15 MG/1
15 TABLET, EXTENDED RELEASE ORAL
Qty: 180 | Refills: 1 | Status: ACTIVE | COMMUNITY
Start: 2018-01-25 | End: 1900-01-01

## 2018-01-30 ENCOUNTER — RX RENEWAL (OUTPATIENT)
Age: 68
End: 2018-01-30

## 2018-01-31 RX ORDER — TICAGRELOR 90 MG/1
90 TABLET ORAL
Qty: 180 | Refills: 3 | Status: ACTIVE | COMMUNITY
Start: 2018-01-30 | End: 1900-01-01

## 2019-09-23 NOTE — ED PROVIDER NOTE - CROS ED ROS STATEMENT
BRIEF OPERATIVE NOTE    Date of Procedure: 9/23/2019   Preoperative Diagnosis: Atherosclerosis of native coronary artery of native heart with [facility] [I25.110]  Postoperative Diagnosis: Atherosclerosis of native coronary artery of native heart with unstable angina pectoris ([facility] [I25.110]    Procedure(s):  CORONARY ARTERY BYPASS GRAFT (CABG X 3)/ LIMA  VEIN HARVEST/ GREATER SAPHENOUS  ESOPHAGEAL TRANS ECHOCARDIOGRAM  Surgeon(s) and Role:     * Amy Esqueda MD - Primary         Surgical Assistant:     Surgical Staff:  Circ-1: Valeria Jacobsen RN  Circ-Relief: Kayla Narayanan RN  Perfusionist: Preston Ley Tech-1: Aris Fernandez  Scrub Tech-2: Rachelle Caballero  Event Time In Time Out   Incision Start 09/23/2019 0813    Incision Close 09/23/2019 1137      Anesthesia: General   Estimated Blood Loss: minimal  Specimens: * No specimens in log *   Findings: cad   Complications: none  Implants: * No implants in log * all other ROS negative except as per HPI

## 2020-01-13 NOTE — DISCHARGE NOTE ADULT - NS AS DC FOLLOWUP INST YN
Please get all labs done today  Continue current levothyroxine dose of 137 mcg p.o. daily  Continue vitamin D and calcium supplementation.  
Yes

## 2023-01-27 NOTE — CONSULT NOTE ADULT - PROVIDER SPECIALTY LIST ADULT
Cardiology
PAST MEDICAL HISTORY:  Afib On Eliquis    COPD (chronic obstructive pulmonary disease)     Edema extremities     HTN (hypertension)     Hyperlipidemia     Hypothyroid     Psoriasis